# Patient Record
Sex: MALE | Race: WHITE | Employment: OTHER | URBAN - METROPOLITAN AREA
[De-identification: names, ages, dates, MRNs, and addresses within clinical notes are randomized per-mention and may not be internally consistent; named-entity substitution may affect disease eponyms.]

---

## 2017-03-27 RX ORDER — CARVEDILOL 25 MG/1
TABLET ORAL
Qty: 180 TABLET | Refills: 2 | Status: SHIPPED | OUTPATIENT
Start: 2017-03-27 | End: 2017-12-08 | Stop reason: SDUPTHER

## 2017-03-27 RX ORDER — HYDRALAZINE HYDROCHLORIDE 50 MG/1
TABLET, FILM COATED ORAL
Qty: 270 TABLET | Refills: 2 | Status: SHIPPED | OUTPATIENT
Start: 2017-03-27 | End: 2017-12-08 | Stop reason: SDUPTHER

## 2017-04-28 ENCOUNTER — OFFICE VISIT (OUTPATIENT)
Dept: CARDIOLOGY CLINIC | Age: 82
End: 2017-04-28

## 2017-04-28 VITALS
HEART RATE: 57 BPM | HEIGHT: 67 IN | WEIGHT: 171 LBS | DIASTOLIC BLOOD PRESSURE: 70 MMHG | SYSTOLIC BLOOD PRESSURE: 160 MMHG | BODY MASS INDEX: 26.84 KG/M2

## 2017-04-28 DIAGNOSIS — I42.9 CARDIOMYOPATHY (HCC): ICD-10-CM

## 2017-04-28 DIAGNOSIS — E78.2 MIXED HYPERLIPIDEMIA: ICD-10-CM

## 2017-04-28 DIAGNOSIS — I10 ESSENTIAL HYPERTENSION: Primary | ICD-10-CM

## 2017-04-28 PROCEDURE — G8420 CALC BMI NORM PARAMETERS: HCPCS | Performed by: INTERNAL MEDICINE

## 2017-04-28 PROCEDURE — 93000 ELECTROCARDIOGRAM COMPLETE: CPT | Performed by: INTERNAL MEDICINE

## 2017-04-28 PROCEDURE — 1123F ACP DISCUSS/DSCN MKR DOCD: CPT | Performed by: INTERNAL MEDICINE

## 2017-04-28 PROCEDURE — 99214 OFFICE O/P EST MOD 30 MIN: CPT | Performed by: INTERNAL MEDICINE

## 2017-04-28 PROCEDURE — 1036F TOBACCO NON-USER: CPT | Performed by: INTERNAL MEDICINE

## 2017-04-28 PROCEDURE — 4040F PNEUMOC VAC/ADMIN/RCVD: CPT | Performed by: INTERNAL MEDICINE

## 2017-04-28 PROCEDURE — G8428 CUR MEDS NOT DOCUMENT: HCPCS | Performed by: INTERNAL MEDICINE

## 2017-07-07 RX ORDER — LOSARTAN POTASSIUM 100 MG/1
TABLET ORAL
Qty: 90 TABLET | Refills: 3 | Status: SHIPPED | OUTPATIENT
Start: 2017-07-07 | End: 2019-07-22 | Stop reason: SDUPTHER

## 2017-11-10 ENCOUNTER — OFFICE VISIT (OUTPATIENT)
Dept: CARDIOLOGY CLINIC | Age: 82
End: 2017-11-10

## 2017-11-10 VITALS
HEIGHT: 67 IN | SYSTOLIC BLOOD PRESSURE: 122 MMHG | DIASTOLIC BLOOD PRESSURE: 64 MMHG | HEART RATE: 60 BPM | BODY MASS INDEX: 27.15 KG/M2 | WEIGHT: 173 LBS

## 2017-11-10 DIAGNOSIS — I65.23 BILATERAL CAROTID ARTERY STENOSIS: ICD-10-CM

## 2017-11-10 DIAGNOSIS — I42.9 CARDIOMYOPATHY, UNSPECIFIED TYPE (HCC): ICD-10-CM

## 2017-11-10 DIAGNOSIS — I71.40 ABDOMINAL AORTIC ANEURYSM (AAA) WITHOUT RUPTURE: Primary | ICD-10-CM

## 2017-11-10 DIAGNOSIS — E78.2 MIXED HYPERLIPIDEMIA: ICD-10-CM

## 2017-11-10 DIAGNOSIS — I10 ESSENTIAL HYPERTENSION: ICD-10-CM

## 2017-11-10 PROCEDURE — G8598 ASA/ANTIPLAT THER USED: HCPCS | Performed by: INTERNAL MEDICINE

## 2017-11-10 PROCEDURE — G8427 DOCREV CUR MEDS BY ELIG CLIN: HCPCS | Performed by: INTERNAL MEDICINE

## 2017-11-10 PROCEDURE — 4040F PNEUMOC VAC/ADMIN/RCVD: CPT | Performed by: INTERNAL MEDICINE

## 2017-11-10 PROCEDURE — 99214 OFFICE O/P EST MOD 30 MIN: CPT | Performed by: INTERNAL MEDICINE

## 2017-11-10 PROCEDURE — G8484 FLU IMMUNIZE NO ADMIN: HCPCS | Performed by: INTERNAL MEDICINE

## 2017-11-10 PROCEDURE — 1123F ACP DISCUSS/DSCN MKR DOCD: CPT | Performed by: INTERNAL MEDICINE

## 2017-11-10 PROCEDURE — 1036F TOBACCO NON-USER: CPT | Performed by: INTERNAL MEDICINE

## 2017-11-10 PROCEDURE — G8419 CALC BMI OUT NRM PARAM NOF/U: HCPCS | Performed by: INTERNAL MEDICINE

## 2017-11-10 NOTE — PROGRESS NOTES
[address]   Cardiac Evaluation      Patient: Logan Sosa  YOB: 1931  Date: 11/10/17     Chief Complaint   Patient presents with    Cardiomyopathy     h/o AAA    Hypertension    Hyperlipidemia    Carotid Disease      Referring provider: Curtis Baldwin    History of Present Illness:   Mr. Casa Gupta comes to the office today in regular follow up. His history includes nonischemic cardiomyopathy, hypertension, and hyperlipidemia. Echo in 2007 showed EF 35%, small posterior pericardial effusion, mild to moderate AR and MR. AAA is 3.6cm per Dr. Mauricio Cruz May, 2013. Mr. Casa Gupta states he feels pretty well overall. He denies exertional chest pain, BALL/PND, palpitations, light-headedness, edema. He is very attentive to his wife and takes care of his yard. He takes long walks around the block several times a week. His recorded home BPs are generally 120-140. Past Medical History:   has a past medical history of Cardiomyopathy ([address] Gastric ulcer; Hyperlipidemia; and Hypertension. Surgical History:   has a past surgical history that includes Diagnostic Cardiac Cath Lab Procedure; hernia repair; and External ear surgery (Left, 08/31/2016). Social History:   reports that he quit smoking about 60 years ago. He does not have any smokeless tobacco history on file. He reports that he does not drink alcohol or use drugs. Family History:  family history is not on file.      Current Outpatient Prescriptions on File Prior to Visit   Medication Sig Dispense Refill    losartan (COZAAR) 100 MG tablet Take 1 tablet by mouth  daily 90 tablet 3    hydrALAZINE (APRESOLINE) 50 MG tablet Take 1 and 1/2 tablets by  mouth 2 times daily 270 tablet 2    carvedilol (COREG) 25 MG tablet Take 1 tablet by mouth two  times daily 180 tablet 2    Multiple Vitamins-Minerals (ICAPS AREDS 2) CAPS Take by mouth      acetaminophen (TYLENOL) 325 MG tablet Take 650 mg by mouth every 6 hours as needed for

## 2017-11-10 NOTE — LETTER
Medication Sig Dispense Refill    losartan (COZAAR) 100 MG tablet Take 1 tablet by mouth  daily 90 tablet 3    hydrALAZINE (APRESOLINE) 50 MG tablet Take 1 and 1/2 tablets by  mouth 2 times daily 270 tablet 2    carvedilol (COREG) 25 MG tablet Take 1 tablet by mouth two  times daily 180 tablet 2    Multiple Vitamins-Minerals (ICAPS AREDS 2) CAPS Take by mouth      acetaminophen (TYLENOL) 325 MG tablet Take 650 mg by mouth every 6 hours as needed for Pain      terazosin (HYTRIN) 5 MG capsule Take 1 capsule by mouth nightly 30 capsule 6    famotidine (PEPCID) 20 MG tablet Take 1 tablet by mouth daily 60 tablet 6    hydrochlorothiazide (HYDRODIURIL) 25 MG tablet Take 1 tablet by mouth daily. 30 tablet 6    cloNIDine (CATAPRES) 0.2 MG tablet Take 1 tablet by mouth nightly. 60 tablet 6    allopurinol (ZYLOPRIM) 300 MG tablet Take 300 mg by mouth daily.  simvastatin (ZOCOR) 20 MG tablet Take 20 mg by mouth nightly.  aspirin 81 MG chewable tablet Take 81 mg by mouth daily. No current facility-administered medications on file prior to visit. Review of Systems:   · Constitutional: there has been no unanticipated weight loss. No change in energy or activity level   · Eyes: No visual changes   · ENT: No Headaches, hearing loss or vertigo. No mouth sores or sore throat. · Cardiovascular: Reviewed in HPI  · Respiratory: No cough or wheezing, no sputum production. No hematemesis. · Gastrointestinal: No abdominal pain, appetite loss, blood in stools. No change in bowel or bladder habits. · Genitourinary: No nocturia, dysuria, trouble voiding  · Musculoskeletal:  No gait disturbance  · Integumentary: No rash or pruritis. · Neurological: No headache. No change in gait, balance, coordination, mood, affect, memory, mentation, behavior.   · Psychiatric: No anxiety or depression  · Endocrine: No malaise or fever  · Hematologic/Lymphatic: No abnormal bruising or bleeding, blood clots or swollen lymph nodes. · Allergic/Immunologic: No nasal congestion or hives. Physical Examination:    Vitals:    11/10/17 1452   BP: 122/64   Site: Left Arm   Position: Sitting   Cuff Size: Medium Adult   Pulse: 60   Weight: 173 lb (78.5 kg)   Height: 5' 7\" (1.702 m)     Body mass index is 27.1 kg/m². Wt Readings from Last 3 Encounters:   11/10/17 173 lb (78.5 kg)   04/28/17 171 lb (77.6 kg)   11/18/16 171 lb (77.6 kg)     BP Readings from Last 3 Encounters:   11/10/17 122/64   04/28/17 (!) 160/70   11/18/16 (!) 116/56       Constitutional and General Appearance:  appears stated age  Respiratory:  · Normal excursion and expansion without use of accessory muscles  · Resp Auscultation: Normal breath sounds without dullness  Cardiovascular:  · The apical impulses not displaced  · Heart is regular rate and rhythm with normal S1, positive S4, 2/6 PABLO, reversed split S2  · The carotid upstroke is normal, no bruit noted   · JVP is not elevated  · Peripheral pulses are symmetrical  · There is no clubbing, cyanosis of the extremities  · No edema  · Femoral Arteries: 2+ and equal  · Pedal Pulses: 2+ and equal   Abdomen:  · No masses or tenderness  · Normal bowel sounds  Neurological/Psychiatric:  · Alert and oriented x3  · Moves all extremities well  · Exhibits normal gait balance and coordination    Assessment:  1. Abdominal aneurysm without mention of rupture: follows with Dr. Francisco Carmichael. CT 5/13> 3.6cm, screening 2/21/17: 3.4cm   2. Cardiomyopathy: Stable, no evidence of heart failure by exam today  Echo 2/14> EF 35%. moderate global hypokinesis. reversal of E/A inflow velocities across the mitral valve suggesting impaired left ventricular relaxation. aortic valve is mildly calcified, opens well with normal gradients. Mild-to-moderate aortic regurgitation is present. Pressure half-time is calculated at 770 msec. mild mitral and tricuspid regurgitation with RVSP 30 mmHg.  Mild-to-moderate pulmonic regurgitation

## 2017-11-10 NOTE — COMMUNICATION BODY
Pain      terazosin (HYTRIN) 5 MG capsule Take 1 capsule by mouth nightly 30 capsule 6    famotidine (PEPCID) 20 MG tablet Take 1 tablet by mouth daily 60 tablet 6    hydrochlorothiazide (HYDRODIURIL) 25 MG tablet Take 1 tablet by mouth daily. 30 tablet 6    cloNIDine (CATAPRES) 0.2 MG tablet Take 1 tablet by mouth nightly. 60 tablet 6    allopurinol (ZYLOPRIM) 300 MG tablet Take 300 mg by mouth daily.  simvastatin (ZOCOR) 20 MG tablet Take 20 mg by mouth nightly.  aspirin 81 MG chewable tablet Take 81 mg by mouth daily. No current facility-administered medications on file prior to visit. Review of Systems:   · Constitutional: there has been no unanticipated weight loss. No change in energy or activity level   · Eyes: No visual changes   · ENT: No Headaches, hearing loss or vertigo. No mouth sores or sore throat. · Cardiovascular: Reviewed in HPI  · Respiratory: No cough or wheezing, no sputum production. No hematemesis. · Gastrointestinal: No abdominal pain, appetite loss, blood in stools. No change in bowel or bladder habits. · Genitourinary: No nocturia, dysuria, trouble voiding  · Musculoskeletal:  No gait disturbance  · Integumentary: No rash or pruritis. · Neurological: No headache. No change in gait, balance, coordination, mood, affect, memory, mentation, behavior. · Psychiatric: No anxiety or depression  · Endocrine: No malaise or fever  · Hematologic/Lymphatic: No abnormal bruising or bleeding, blood clots or swollen lymph nodes. · Allergic/Immunologic: No nasal congestion or hives. Physical Examination:    Vitals:    11/10/17 1452   BP: 122/64   Site: Left Arm   Position: Sitting   Cuff Size: Medium Adult   Pulse: 60   Weight: 173 lb (78.5 kg)   Height: 5' 7\" (1.702 m)     Body mass index is 27.1 kg/m².      Wt Readings from Last 3 Encounters:   11/10/17 173 lb (78.5 kg)   04/28/17 171 lb (77.6 kg)   11/18/16 171 lb (77.6 kg)     BP Readings from Last 3 Encounters: 11/10/17 122/64   04/28/17 (!) 160/70   11/18/16 (!) 116/56       Constitutional and General Appearance:  appears stated age  Respiratory:  · Normal excursion and expansion without use of accessory muscles  · Resp Auscultation: Normal breath sounds without dullness  Cardiovascular:  · The apical impulses not displaced  · Heart is regular rate and rhythm with normal S1, positive S4, 2/6 PABLO, reversed split S2  · The carotid upstroke is normal, no bruit noted   · JVP is not elevated  · Peripheral pulses are symmetrical  · There is no clubbing, cyanosis of the extremities  · No edema  · Femoral Arteries: 2+ and equal  · Pedal Pulses: 2+ and equal   Abdomen:  · No masses or tenderness  · Normal bowel sounds  Neurological/Psychiatric:  · Alert and oriented x3  · Moves all extremities well  · Exhibits normal gait balance and coordination    Assessment:  1. Abdominal aneurysm without mention of rupture: follows with Dr. Latrice Yan. CT 5/13> 3.6cm, screening 2/21/17: 3.4cm   2. Cardiomyopathy: Stable, no evidence of heart failure by exam today  Echo 2/14> EF 35%. moderate global hypokinesis. reversal of E/A inflow velocities across the mitral valve suggesting impaired left ventricular relaxation. aortic valve is mildly calcified, opens well with normal gradients. Mild-to-moderate aortic regurgitation is present. Pressure half-time is calculated at 770 msec. mild mitral and tricuspid regurgitation with RVSP 30 mmHg. Mild-to-moderate pulmonic regurgitation present. Upper limits of normal to mildly dilated left atrium with a volume of 66ml  Echo 9/08>  EF 35%, LVH, mild to moderate AI   3. Hypertension: Stable. Blood pressure 122/64, pulse 60, height 5' 7\" (1.702 m), weight 173 lb (78.5 kg). 4. Hyperlipemia: Well controlled on Zocor 20mg. 5.  Carotid stenosis: Stable. Doppler 6/3/16> 16-49% bilateral    EKG 5/3/17> Sinus bradycardia, LBBB    Plan:  Mr. Kenneth Santiago has a stable cardiac status. 1. No med changes.   2. Will

## 2017-12-11 RX ORDER — HYDRALAZINE HYDROCHLORIDE 50 MG/1
TABLET, FILM COATED ORAL
Qty: 270 TABLET | Refills: 3 | Status: SHIPPED | OUTPATIENT
Start: 2017-12-11 | End: 2018-09-14 | Stop reason: SDUPTHER

## 2017-12-11 RX ORDER — CARVEDILOL 25 MG/1
TABLET ORAL
Qty: 180 TABLET | Refills: 3 | Status: SHIPPED | OUTPATIENT
Start: 2017-12-11 | End: 2018-11-28 | Stop reason: SDUPTHER

## 2018-05-18 ENCOUNTER — OFFICE VISIT (OUTPATIENT)
Dept: CARDIOLOGY CLINIC | Age: 83
End: 2018-05-18

## 2018-05-18 VITALS
WEIGHT: 166 LBS | DIASTOLIC BLOOD PRESSURE: 54 MMHG | HEIGHT: 67 IN | SYSTOLIC BLOOD PRESSURE: 110 MMHG | BODY MASS INDEX: 26.06 KG/M2 | HEART RATE: 70 BPM

## 2018-05-18 DIAGNOSIS — I42.9 CARDIOMYOPATHY, UNSPECIFIED TYPE (HCC): Primary | ICD-10-CM

## 2018-05-18 DIAGNOSIS — I10 ESSENTIAL HYPERTENSION: ICD-10-CM

## 2018-05-18 DIAGNOSIS — E78.2 MIXED HYPERLIPIDEMIA: ICD-10-CM

## 2018-05-18 PROCEDURE — 4040F PNEUMOC VAC/ADMIN/RCVD: CPT | Performed by: INTERNAL MEDICINE

## 2018-05-18 PROCEDURE — 99214 OFFICE O/P EST MOD 30 MIN: CPT | Performed by: INTERNAL MEDICINE

## 2018-05-18 PROCEDURE — G8427 DOCREV CUR MEDS BY ELIG CLIN: HCPCS | Performed by: INTERNAL MEDICINE

## 2018-05-18 PROCEDURE — 1036F TOBACCO NON-USER: CPT | Performed by: INTERNAL MEDICINE

## 2018-05-18 PROCEDURE — G8419 CALC BMI OUT NRM PARAM NOF/U: HCPCS | Performed by: INTERNAL MEDICINE

## 2018-05-18 PROCEDURE — G8598 ASA/ANTIPLAT THER USED: HCPCS | Performed by: INTERNAL MEDICINE

## 2018-05-18 PROCEDURE — 1123F ACP DISCUSS/DSCN MKR DOCD: CPT | Performed by: INTERNAL MEDICINE

## 2018-05-18 RX ORDER — BRIMONIDINE TARTRATE, TIMOLOL MALEATE 2; 5 MG/ML; MG/ML
1 SOLUTION/ DROPS OPHTHALMIC EVERY 12 HOURS
COMMUNITY

## 2018-06-25 DIAGNOSIS — K40.91 RECURRENT INGUINAL HERNIA WITHOUT OBSTRUCTION OR GANGRENE, UNSPECIFIED LATERALITY: Primary | ICD-10-CM

## 2018-06-29 ENCOUNTER — TELEPHONE (OUTPATIENT)
Dept: CARDIOLOGY CLINIC | Age: 83
End: 2018-06-29

## 2018-07-10 ENCOUNTER — OFFICE VISIT (OUTPATIENT)
Dept: SURGERY | Age: 83
End: 2018-07-10

## 2018-07-10 VITALS
DIASTOLIC BLOOD PRESSURE: 62 MMHG | BODY MASS INDEX: 25.46 KG/M2 | HEIGHT: 68 IN | SYSTOLIC BLOOD PRESSURE: 136 MMHG | WEIGHT: 168 LBS

## 2018-07-10 DIAGNOSIS — K40.90 LEFT INGUINAL HERNIA: Primary | ICD-10-CM

## 2018-07-10 PROCEDURE — G8419 CALC BMI OUT NRM PARAM NOF/U: HCPCS | Performed by: SURGERY

## 2018-07-10 PROCEDURE — 99203 OFFICE O/P NEW LOW 30 MIN: CPT | Performed by: SURGERY

## 2018-07-10 PROCEDURE — G8427 DOCREV CUR MEDS BY ELIG CLIN: HCPCS | Performed by: SURGERY

## 2018-07-10 PROCEDURE — 1101F PT FALLS ASSESS-DOCD LE1/YR: CPT | Performed by: SURGERY

## 2018-07-10 ASSESSMENT — ENCOUNTER SYMPTOMS
EYE ITCHING: 1
GASTROINTESTINAL NEGATIVE: 1
SHORTNESS OF BREATH: 1
BACK PAIN: 1
ALLERGIC/IMMUNOLOGIC NEGATIVE: 1
COLOR CHANGE: 1

## 2018-07-10 NOTE — PROGRESS NOTES
Yes Mariah Herrera MD   famotidine (PEPCID) 20 MG tablet Take 1 tablet by mouth daily 5/4/15  Yes Mariah Herrera MD   hydrochlorothiazide (HYDRODIURIL) 25 MG tablet Take 1 tablet by mouth daily. 11/10/14  Yes Mariah Herrera MD   cloNIDine (CATAPRES) 0.2 MG tablet Take 1 tablet by mouth nightly. Patient taking differently: Take 0.1 mg by mouth nightly  11/10/14  Yes Mariah Herrera MD   allopurinol (ZYLOPRIM) 300 MG tablet Take 300 mg by mouth daily. Yes Historical Provider, MD   simvastatin (ZOCOR) 20 MG tablet Take 20 mg by mouth nightly. Yes Historical Provider, MD   aspirin 81 MG chewable tablet Take 81 mg by mouth daily. Yes Historical Provider, MD        Allergies:  Penicillins    Social History:    reports that he quit smoking about 61 years ago. He has never used smokeless tobacco. He reports that he does not drink alcohol or use drugs. Family History:    No family history on file. REVIEW OF SYSTEMS:  Review of Systems   Constitutional: Positive for activity change, appetite change, chills and fatigue. HENT: Positive for drooling, hearing loss and sneezing. Eyes: Positive for itching. Respiratory: Positive for shortness of breath. Cardiovascular: Positive for leg swelling. Gastrointestinal: Negative. Endocrine: Negative. Genitourinary: Positive for decreased urine volume and frequency. Musculoskeletal: Positive for back pain and neck stiffness. Skin: Positive for color change. Allergic/Immunologic: Negative. Neurological: Positive for dizziness, weakness and light-headedness. Hematological: Bruises/bleeds easily. Psychiatric/Behavioral: The patient is nervous/anxious.         PHYSICAL EXAM:  VITALS:  /62   Ht 5' 8\" (1.727 m)   Wt 168 lb (76.2 kg)   BMI 25.54 kg/m²   CONSTITUTIONAL:  alert, no apparent distress and normal weight  EYES:  sclera clear  ENT:  normocepalic, without obvious abnormality  NECK:  supple, symmetrical, trachea midline  LUNGS:  clear to auscultation  CARDIOVASCULAR:  regular rate and rhythm  ABDOMEN:  scars noted are healed, normal bowel sounds, soft, non-distended, tenderness noted in the left lower quadrant, voluntary guarding absent, no masses palpated, no hepatosplenomegally and hernia present - left inguinal  MUSCULOSKELETAL:  0+ edema lower extremities  NEUROLOGIC:  Mental Status Exam:  Level of Alertness:   awake  Orientation:   person, place, time  SKIN:  no bruising or bleeding, normal skin color, texture, turgor, no redness, warmth, or swelling and no jaundice    DATA:      Radiology Review:   None    IMPRESSION/RECOMMENDATIONS:    Left inguinal hernia. Lap Left inguinal hernia repair will be scheduled. The plan for surgery has been reviewed in detail today. Risks and benefits have been reviewed, and all questions answered. Will schedule at [facility] after pt assesses wife's upcoming health issue needs.        Radha Mckeon

## 2018-07-19 ENCOUNTER — TELEPHONE (OUTPATIENT)
Dept: CARDIOLOGY CLINIC | Age: 83
End: 2018-07-19

## 2018-07-19 NOTE — TELEPHONE ENCOUNTER
Per Dr Remy Montes patient has stable cardiomyopathy and valvular disease; may proceed with hernia repair.

## 2018-07-19 NOTE — LETTER
[address]  [address] . [facility]  [address] [address]  Phone: [phone]  Fax: [phone]    Kenney Tucker MD        July 19, 2018     Patient: Virgilio Mendoza   YOB: 1931   Date of Visit: 7/19/2018       To Whom It May Concern: It is my medical opinion that Bonnie Chandra has stable cardiomyopathy and valvular disease; may proceed with hernia repair. If you have any questions or concerns, please don't hesitate to call.     Sincerely,        Kenney Tucker MD

## 2018-07-23 ENCOUNTER — HOSPITAL ENCOUNTER (OUTPATIENT)
Dept: SURGERY | Age: 83
Discharge: HOME | End: 2018-07-23
Attending: SURGERY | Admitting: SURGERY

## 2018-07-23 VITALS
HEART RATE: 76 BPM | TEMPERATURE: 97.3 F | OXYGEN SATURATION: 95 % | HEIGHT: 68 IN | BODY MASS INDEX: 25.17 KG/M2 | DIASTOLIC BLOOD PRESSURE: 69 MMHG | RESPIRATION RATE: 13 BRPM | WEIGHT: 166.06 LBS | SYSTOLIC BLOOD PRESSURE: 183 MMHG

## 2018-07-23 DIAGNOSIS — L76.82 PAIN AT SURGICAL INCISION: Primary | ICD-10-CM

## 2018-07-23 DIAGNOSIS — K40.90 LEFT INGUINAL HERNIA: ICD-10-CM

## 2018-07-23 PROCEDURE — 49650 LAP ING HERNIA REPAIR INIT: CPT | Performed by: SURGERY

## 2018-07-23 RX ORDER — ONDANSETRON 2 MG/ML
4 INJECTION INTRAMUSCULAR; INTRAVENOUS
Status: ACTIVE | OUTPATIENT
Start: 2018-07-23 | End: 2018-07-23

## 2018-07-23 RX ORDER — SODIUM CHLORIDE 0.9 % (FLUSH) 0.9 %
10 SYRINGE (ML) INJECTION EVERY 12 HOURS SCHEDULED
Status: DISCONTINUED | OUTPATIENT
Start: 2018-07-23 | End: 2018-07-24 | Stop reason: HOSPADM

## 2018-07-23 RX ORDER — HYDRALAZINE HYDROCHLORIDE 20 MG/ML
5 INJECTION INTRAMUSCULAR; INTRAVENOUS EVERY 10 MIN PRN
Status: DISCONTINUED | OUTPATIENT
Start: 2018-07-23 | End: 2018-07-24 | Stop reason: HOSPADM

## 2018-07-23 RX ORDER — OXYCODONE HYDROCHLORIDE AND ACETAMINOPHEN 5; 325 MG/1; MG/1
1 TABLET ORAL
Status: COMPLETED | OUTPATIENT
Start: 2018-07-23 | End: 2018-07-23

## 2018-07-23 RX ORDER — HYDROCODONE BITARTRATE AND ACETAMINOPHEN 5; 325 MG/1; MG/1
1 TABLET ORAL EVERY 4 HOURS PRN
Qty: 20 TABLET | Refills: 0 | Status: SHIPPED | OUTPATIENT
Start: 2018-07-23 | End: 2018-07-30

## 2018-07-23 RX ORDER — LIDOCAINE HYDROCHLORIDE 10 MG/ML
1 INJECTION, SOLUTION EPIDURAL; INFILTRATION; INTRACAUDAL; PERINEURAL
Status: ACTIVE | OUTPATIENT
Start: 2018-07-23 | End: 2018-07-23

## 2018-07-23 RX ORDER — HYDROMORPHONE HCL 110MG/55ML
0.25 PATIENT CONTROLLED ANALGESIA SYRINGE INTRAVENOUS EVERY 5 MIN PRN
Status: DISCONTINUED | OUTPATIENT
Start: 2018-07-23 | End: 2018-07-24 | Stop reason: HOSPADM

## 2018-07-23 RX ORDER — SODIUM CHLORIDE, SODIUM LACTATE, POTASSIUM CHLORIDE, CALCIUM CHLORIDE 600; 310; 30; 20 MG/100ML; MG/100ML; MG/100ML; MG/100ML
INJECTION, SOLUTION INTRAVENOUS CONTINUOUS
Status: DISCONTINUED | OUTPATIENT
Start: 2018-07-23 | End: 2018-07-24 | Stop reason: HOSPADM

## 2018-07-23 RX ORDER — CIPROFLOXACIN 2 MG/ML
400 INJECTION, SOLUTION INTRAVENOUS ONCE
Status: COMPLETED | OUTPATIENT
Start: 2018-07-23 | End: 2018-07-23

## 2018-07-23 RX ORDER — SODIUM CHLORIDE 0.9 % (FLUSH) 0.9 %
10 SYRINGE (ML) INJECTION PRN
Status: DISCONTINUED | OUTPATIENT
Start: 2018-07-23 | End: 2018-07-24 | Stop reason: HOSPADM

## 2018-07-23 RX ORDER — LABETALOL HYDROCHLORIDE 5 MG/ML
5 INJECTION, SOLUTION INTRAVENOUS EVERY 10 MIN PRN
Status: DISCONTINUED | OUTPATIENT
Start: 2018-07-23 | End: 2018-07-24 | Stop reason: HOSPADM

## 2018-07-23 RX ADMIN — Medication 0.25 MG: at 14:12

## 2018-07-23 RX ADMIN — CIPROFLOXACIN 400 MG: 2 INJECTION, SOLUTION INTRAVENOUS at 12:22

## 2018-07-23 RX ADMIN — OXYCODONE HYDROCHLORIDE AND ACETAMINOPHEN 1 TABLET: 5; 325 TABLET ORAL at 14:21

## 2018-07-23 RX ADMIN — SODIUM CHLORIDE, SODIUM LACTATE, POTASSIUM CHLORIDE, CALCIUM CHLORIDE: 600; 310; 30; 20 INJECTION, SOLUTION INTRAVENOUS at 11:57

## 2018-07-23 ASSESSMENT — PAIN DESCRIPTION - LOCATION: LOCATION: GROIN

## 2018-07-23 ASSESSMENT — PAIN - FUNCTIONAL ASSESSMENT: PAIN_FUNCTIONAL_ASSESSMENT: 0-10

## 2018-07-23 ASSESSMENT — PAIN DESCRIPTION - PAIN TYPE: TYPE: SURGICAL PAIN

## 2018-07-23 ASSESSMENT — PAIN DESCRIPTION - DESCRIPTORS
DESCRIPTORS: BURNING
DESCRIPTORS: ACHING

## 2018-07-23 ASSESSMENT — PAIN SCALES - GENERAL
PAINLEVEL_OUTOF10: 6
PAINLEVEL_OUTOF10: 6

## 2018-07-23 ASSESSMENT — PAIN DESCRIPTION - FREQUENCY: FREQUENCY: CONTINUOUS

## 2018-07-23 ASSESSMENT — PAIN DESCRIPTION - ORIENTATION: ORIENTATION: LEFT

## 2018-07-23 ASSESSMENT — PAIN DESCRIPTION - ONSET: ONSET: ON-GOING

## 2018-07-23 NOTE — BRIEF OP NOTE
Brief Postoperative Note    Almas Mast  YOB: 1931  [id number]    Pre-operative Diagnosis: Left inguinal Hernia    Post-operative Diagnosis: Same    Procedure: Lap LIH repair with mesh    Anesthesia: General and Local    Surgeons/Assistants: Yissel    Estimated Blood Loss: less than 50     Complications: None    Specimens: Was Not Obtained    Findings: Large LIH present, large direct component reduced.  Repair done with Bard 3 D max left extra large mesh    Electronically signed by Crista Mc MD on 7/23/2018 at 1:27 PM

## 2018-07-23 NOTE — OP NOTE
[facility]                      [address] [facility], [address] [place] [address]                                 OPERATIVE REPORT    PATIENT NAME: Hilario Villalta                    :        1931  MED REC NO:   [id number]                          ROOM:  ACCOUNT NO:   [de-identified]                          ADMIT DATE: 2018  PROVIDER:     Frederic Branham MD    DATE OF PROCEDURE:  2018    PREOPERATIVE DIAGNOSIS:  Left inguinal hernia. POSTOPERATIVE DIAGNOSIS:  Left inguinal hernia. PROCEDURE:  Laparoscopic preperitoneal left inguinal hernia repair with  mesh, Bard 3DMax left-sided extra large mesh used for repair. SURGEON:  Frederic Branham M.D. ANESTHESIA:  General plus local at port sites as well. ESTIMATED BLOOD LOSS:  Minimal.    COMPLICATIONS:  None. SPECIMENS:  None obtained. FINDINGS:  A large left inguinal hernia present. There was a large  incarcerated direct component reduced and repair was performed with the  Bard mesh. INDICATIONS:  The patient is an 80-year-old gentleman presenting with a  tender, enlarging left inguinal hernia for repair today. The site is  confirmed with him preoperatively. All questions were answered with the  patient and a family and he consents to proceed. Antibiotics were ordered. ADDITIONAL DETAILS OF SURGERY:  The patient was brought to the operating  room, placed on the operating table in supine position. Compression boots  were placed. Antibiotics were completed. General anesthetic was  administered. The abdomen was prepped and draped sterilely. Time-out was done and local anesthetic was administered infraumbilically. Incision was made at this site and dissection was carried down through the  skin and subcutaneous tissue to the rectus fascia. To the left midline,  the rectus sheath was opened. The muscles were split to the side and the  posterior rectus sheath was visualized.   In this plane, the balloon  dissecting catheter was passed down to the level of the pubis and inflated  with 30 pumps of bulb. Balloon was then removed and the working 12 mm port  was placed at this site. The preperitoneal space was then insufflated to  15 mmHg pressure. Two 5 mm ports were then placed in the lower midline  under direct vision. The anatomy was then dissected. There was a large  incarcerated direct hernia present, which was reduced back deep in the  preperitoneal space. The lateral space was opened widely. A lateral  femoral cutaneous nerve was identified and carefully avoided. The cord was  dissected. There was a small lipoma of the cord, which was reduced back  along with a small indirect sac as well. Once this was completed, the  repair was performed. The Bard 3DMax left-sided extra large mesh was used  for the repair. This was positioned covering the direct space, indirect  space, and femoral space nicely. It was then tacked medially at the pubis,  inferiorly at Christian's ligament, and superiorly into the posterior aspect  of the rectus muscle above Hesselbach's triangle. No tacks were placed out  laterally or inferolaterally as to avoid the nerves and vessels in these  locations. Mesh was held down in position out laterally and sat down  nicely for the repair. The preperitoneal space was deflated. The  instruments and ports were then removed. The port sites appeared  hemostatic. The instruments were all removed and the counts were correct. The fascia was closed at the umbilical site with 0 Vicryl figure-of-eight  suture. Skin was closed at all sites with 4-0 Monocryl suture. Skin Affix  glue was placed. The patient was then taken to recovery room postop.         Benito Wood MD    D: 07/23/2018 13:44:45       T: 07/23/2018 13:48:07     [id number]  Job#: [id number]     Doc#: [id number]    CC:  Vanessa Sharma

## 2018-07-23 NOTE — H&P
[facility]  and Laparoscopic Surgery      I have reviewed the history and physical and examined the patient and find no relevant changes. I have reviewed with the patient and/or family the risks, benefits, and alternatives to the procedure.     Naila Hampton MD  7/23/2018

## 2018-07-23 NOTE — ANESTHESIA PRE-OP
drop into both eyes every 12 hours      carvedilol (COREG) 25 MG tablet TAKE 1 TABLET BY MOUTH TWO  TIMES DAILY 180 tablet 3    hydrALAZINE (APRESOLINE) 50 MG tablet TAKE 1 AND 1/2 TABLETS BY  MOUTH 2 TIMES DAILY 270 tablet 3    losartan (COZAAR) 100 MG tablet Take 1 tablet by mouth  daily (Patient taking differently: Take 1/2 tablet by mouth  daily) 90 tablet 3    acetaminophen (TYLENOL) 325 MG tablet Take 650 mg by mouth nightly       terazosin (HYTRIN) 5 MG capsule Take 1 capsule by mouth nightly 30 capsule 6    famotidine (PEPCID) 20 MG tablet Take 1 tablet by mouth daily (Patient taking differently: Take 20 mg by mouth daily TAKES TWICE A DAY) 60 tablet 6    hydrochlorothiazide (HYDRODIURIL) 25 MG tablet Take 1 tablet by mouth daily. 30 tablet 6    cloNIDine (CATAPRES) 0.2 MG tablet Take 1 tablet by mouth nightly. (Patient taking differently: Take 0.1 mg by mouth nightly ) 60 tablet 6    allopurinol (ZYLOPRIM) 300 MG tablet Take 300 mg by mouth daily.  simvastatin (ZOCOR) 20 MG tablet Take 20 mg by mouth nightly.  aspirin 81 MG chewable tablet Take 81 mg by mouth daily. Current Facility-Administered Medications   Medication Dose Route Frequency Provider Last Rate Last Dose    HYDROmorphone (DILAUDID) injection 0.25 mg  0.25 mg Intravenous Q5 Min PRN González Gomez MD        oxyCODONE-acetaminophen (PERCOCET) 5-325 MG per tablet 1 tablet  1 tablet Oral Once PRN González Gomez MD        ondansetron [facility]) injection 4 mg  4 mg Intravenous Once PRN González Gomez MD        labetalol (NORMODYNE;TRANDATE) injection 5 mg  5 mg Intravenous Q10 Min PRN González Gomez MD        hydrALAZINE (APRESOLINE) injection 5 mg  5 mg Intravenous Q10 Min PRN González Gomez MD           Allergies:     Allergies   Allergen Reactions    Penicillins Swelling and Hives    Indapamide      itching    Naproxen     Omeprazole Magnesium      Nausea, chest pain, dysphagia       Problem List:    Patient Active Problem List   Diagnosis Code    Hyperlipemia E78.5    HTN (hypertension) I10    Cardiomyopathy ([facility] I42.9    Abdominal aortic aneurysm (HCC) I71.4    Bilateral carotid artery stenosis I65.23       Past Medical History:        Diagnosis Date    Arthritis     Blind right eye     Cardiomyopathy ([facility]     Gastric ulcer     Glaucoma     Gout     Hyperlipidemia     Hypertension     PVD (peripheral vascular disease) ([facility]        Past Surgical History:        Procedure Laterality Date    DIAGNOSTIC CARDIAC CATH LAB PROCEDURE      EXTERNAL EAR SURGERY Left 08/31/2016    ACTUAL PROCEDURE: LEFT EAR RECONSTRUCTION     HERNIA REPAIR      KNEE ARTHROSCOPY      VASECTOMY         Social History:    Social History   Substance Use Topics    Smoking status: Former Smoker     Quit date: 1/1/1957    Smokeless tobacco: Never Used      Comment: only smoked in the service    Alcohol use No                                Counseling given: Not Answered      Vital Signs (Current): There were no vitals filed for this visit. BP Readings from Last 3 Encounters:   07/10/18 136/62   05/18/18 (!) 110/54   11/10/17 122/64       NPO Status:                                                   Date of last liquid consumption: 07/22/18                        Date of last solid food consumption: 07/22/18    BMI:   Wt Readings from Last 3 Encounters:   07/18/18 165 lb (74.8 kg)   07/10/18 168 lb (76.2 kg)   05/18/18 166 lb (75.3 kg)     There is no height or weight on file to calculate BMI.     Anesthesia Evaluation  Patient summary reviewed and Nursing notes reviewed no history of anesthetic complications:   Airway: Mallampati: III  TM distance: >3 FB   Neck ROM: full  Mouth opening: > = 3 FB Dental: normal exam   (+) implants      Pulmonary:normal exam  breath sounds clear to auscultation      (-) COPD, asthma and sleep apnea                          ROS comment: Former smoker   Cardiovascular:    (+) hypertension:, CHF (NICM, follows cards, echo 2014 EF 35%, mod global hypok, DD, mod AR/MT, mild MR/TR, RVSP 30; currently appears well compensated, no O2 req, walks regularly, no orthopnea or LE edema): systolic, murmur, hyperlipidemia    (-) past MI, CAD, CABG/stent, dysrhythmias and  angina    ECG reviewed  Rhythm: regular  Rate: normal  Echocardiogram reviewed         Beta Blocker:  Dose within 24 Hrs         Neuro/Psych:   Negative Neuro/Psych ROS     (-) seizures, TIA and CVA           GI/Hepatic/Renal:   (+) GERD: well controlled, PUD,      (-) liver disease and no renal disease       Endo/Other:    (+) : arthritis (Gout):., .    (-) diabetes mellitus, hypothyroidism, hyperthyroidism               Abdominal:           Vascular:   + PVD, aortic or cerebral ( stable abdominal aortic aneurysm and mild bilateral carotid stenosis), . Anesthesia Plan      general     ASA 4       Induction: intravenous. MIPS: Postoperative opioids intended, Prophylactic antiemetics administered and Postoperative trial extubation. Anesthetic plan and risks discussed with patient. Plan discussed with CRNA.                   Real Parks MD   7/23/2018 detailed exam

## 2018-08-06 ENCOUNTER — OFFICE VISIT (OUTPATIENT)
Dept: SURGERY | Age: 83
End: 2018-08-06

## 2018-08-06 VITALS — DIASTOLIC BLOOD PRESSURE: 62 MMHG | SYSTOLIC BLOOD PRESSURE: 106 MMHG

## 2018-08-06 DIAGNOSIS — K40.90 LEFT INGUINAL HERNIA: Primary | ICD-10-CM

## 2018-08-06 PROCEDURE — 99024 POSTOP FOLLOW-UP VISIT: CPT | Performed by: SURGERY

## 2018-08-06 NOTE — PROGRESS NOTES
Subjective:      Patient ID: Anton Tsang is a 80 y.o. male. HPI    Review of Systems    Objective:   Physical Exam  Abdomen soft  Incisions healing well  Assessment:      70-year-old male status post laparoscopic left inguinal hernia repair with mesh per Dr. Royal Thompson. Doing well postoperatively. Plan: Activity as tolerated. Follow up as needed.

## 2018-09-14 RX ORDER — HYDRALAZINE HYDROCHLORIDE 50 MG/1
TABLET, FILM COATED ORAL
Qty: 270 TABLET | Refills: 2 | Status: SHIPPED | OUTPATIENT
Start: 2018-09-14 | End: 2019-10-01 | Stop reason: SDUPTHER

## 2018-11-28 RX ORDER — CARVEDILOL 25 MG/1
TABLET ORAL
Qty: 180 TABLET | Refills: 3 | Status: SHIPPED | OUTPATIENT
Start: 2018-11-28 | End: 2019-10-28 | Stop reason: SDUPTHER

## 2018-11-30 ENCOUNTER — OFFICE VISIT (OUTPATIENT)
Dept: CARDIOLOGY CLINIC | Age: 83
End: 2018-11-30
Payer: MEDICARE

## 2018-11-30 VITALS
BODY MASS INDEX: 26.37 KG/M2 | SYSTOLIC BLOOD PRESSURE: 118 MMHG | DIASTOLIC BLOOD PRESSURE: 54 MMHG | HEART RATE: 60 BPM | HEIGHT: 67 IN | WEIGHT: 168 LBS

## 2018-11-30 DIAGNOSIS — I10 ESSENTIAL HYPERTENSION: ICD-10-CM

## 2018-11-30 DIAGNOSIS — I42.9 CARDIOMYOPATHY, UNSPECIFIED TYPE (HCC): Primary | ICD-10-CM

## 2018-11-30 DIAGNOSIS — I71.40 ABDOMINAL AORTIC ANEURYSM (AAA) WITHOUT RUPTURE: ICD-10-CM

## 2018-11-30 DIAGNOSIS — E78.2 MIXED HYPERLIPIDEMIA: ICD-10-CM

## 2018-11-30 PROCEDURE — 1123F ACP DISCUSS/DSCN MKR DOCD: CPT | Performed by: INTERNAL MEDICINE

## 2018-11-30 PROCEDURE — 1101F PT FALLS ASSESS-DOCD LE1/YR: CPT | Performed by: INTERNAL MEDICINE

## 2018-11-30 PROCEDURE — G8598 ASA/ANTIPLAT THER USED: HCPCS | Performed by: INTERNAL MEDICINE

## 2018-11-30 PROCEDURE — G8427 DOCREV CUR MEDS BY ELIG CLIN: HCPCS | Performed by: INTERNAL MEDICINE

## 2018-11-30 PROCEDURE — 99214 OFFICE O/P EST MOD 30 MIN: CPT | Performed by: INTERNAL MEDICINE

## 2018-11-30 PROCEDURE — G8419 CALC BMI OUT NRM PARAM NOF/U: HCPCS | Performed by: INTERNAL MEDICINE

## 2018-11-30 PROCEDURE — G8484 FLU IMMUNIZE NO ADMIN: HCPCS | Performed by: INTERNAL MEDICINE

## 2018-11-30 PROCEDURE — 1036F TOBACCO NON-USER: CPT | Performed by: INTERNAL MEDICINE

## 2018-11-30 PROCEDURE — 4040F PNEUMOC VAC/ADMIN/RCVD: CPT | Performed by: INTERNAL MEDICINE

## 2018-11-30 NOTE — LETTER
· Genitourinary: No nocturia, dysuria, trouble voiding  · Musculoskeletal:  No gait disturbance  · Integumentary: No rash or pruritis. · Neurological: No headache. No change in gait, balance, coordination, mood, affect, memory, mentation, behavior. · Psychiatric: No anxiety or depression  · Endocrine: No malaise or fever  · Hematologic/Lymphatic: No abnormal bruising or bleeding, blood clots or swollen lymph nodes. · Allergic/Immunologic: No nasal congestion or hives. Physical Examination:    Vitals:    11/30/18 1416   BP: (!) 118/54   Site: Left Upper Arm   Position: Sitting   Cuff Size: Medium Adult   Pulse: 60   Weight: 168 lb (76.2 kg)   Height: 5' 7\" (1.702 m)     Body mass index is 26.31 kg/m². Wt Readings from Last 3 Encounters:   11/30/18 168 lb (76.2 kg)   07/23/18 166 lb 1 oz (75.3 kg)   07/18/18 165 lb (74.8 kg)     BP Readings from Last 3 Encounters:   11/30/18 (!) 118/54   08/06/18 106/62   07/23/18 (!) 183/69       Constitutional and General Appearance:  appears stated age  Respiratory:  · Normal excursion and expansion without use of accessory muscles  · Resp Auscultation: Normal breath sounds without dullness  Cardiovascular:  · The apical impulses not displaced  · Heart is regular rate and rhythm with normal S1, positive S4, 2/6 PABLO, reversed split S2  · The carotid upstroke is normal, no bruit noted   · JVP is not elevated  · Peripheral pulses are symmetrical  · There is no clubbing, cyanosis of the extremities  · No edema  · Femoral Arteries: 2+ and equal  · Pedal Pulses: 2+ and equal   Abdomen:  · No masses or tenderness  · Normal bowel sounds  Neurological/Psychiatric:  · Alert and oriented x3  · Moves all extremities well  · Exhibits normal gait balance and coordination    Assessment:  1. 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  Cardiomyopathy: Stable, no evidence of heart failure by exam today

## 2018-11-30 NOTE — PROGRESS NOTES
[address]   Cardiac Evaluation      Patient: Lela Sherman  YOB: 1931  Date: 11/30/18     Chief Complaint   Patient presents with    Hyperlipidemia    Hypertension      Referring provider: David Wadsworth    History of Present Illness:   Mr. Pia De Jesus comes to the office today in regular follow up. His history includes nonischemic cardiomyopathy, hypertension, and hyperlipidemia. Echo in 2007 showed EF 35%, small posterior pericardial effusion, mild to moderate AR and MRVinayak AAA is 3.6cm per Dr. Adele Olea May, 2013. Mr. Pia De Jesus states he feels pretty well overall. He denies exertional chest pain, BALL/PND, palpitations, light-headedness, edema. He is very attentive to his wife and takes care of his yard. He takes walks around the block several times a week. Past Medical History:   has a past medical history of Arthritis; Blind right eye; Cardiomyopathy ([address] Gastric ulcer; Glaucoma; Gout; Hyperlipidemia; Hypertension; and PVD (peripheral vascular disease) ([facility] [address] Surgical History:   has a past surgical history that includes Diagnostic Cardiac Cath Lab Procedure; hernia repair; External ear surgery (Left, 08/31/2016); Knee arthroscopy; Vasectomy; and Inguinal hernia repair (Right, 07/23/2018). Social History:   reports that he quit smoking about 61 years ago. He has never used smokeless tobacco. He reports that he does not drink alcohol or use drugs. Family History:  family history is not on file.      Current Outpatient Prescriptions on File Prior to Visit   Medication Sig Dispense Refill    carvedilol (COREG) 25 MG tablet TAKE 1 TABLET BY MOUTH TWO  TIMES DAILY 180 tablet 3    hydrALAZINE (APRESOLINE) 50 MG tablet TAKE 1 AND 1/2 TABLETS BY  MOUTH 2 TIMES DAILY 270 tablet 2    brimonidine-timolol (COMBIGAN) 0.2-0.5 % ophthalmic solution Place 1 drop into both eyes every 12 hours      losartan (COZAAR) 100 MG tablet Take 1 tablet by mouth  daily (Patient taking differently: Take 1/2 tablet by mouth  daily) 90 tablet 3    acetaminophen (TYLENOL) 325 MG tablet Take 650 mg by mouth nightly       famotidine (PEPCID) 20 MG tablet Take 1 tablet by mouth daily (Patient taking differently: Take 20 mg by mouth daily TAKES TWICE A DAY) 60 tablet 6    hydrochlorothiazide (HYDRODIURIL) 25 MG tablet Take 1 tablet by mouth daily. 30 tablet 6    cloNIDine (CATAPRES) 0.2 MG tablet Take 1 tablet by mouth nightly. (Patient taking differently: Take 0.1 mg by mouth nightly ) 60 tablet 6    allopurinol (ZYLOPRIM) 300 MG tablet Take 300 mg by mouth daily.  simvastatin (ZOCOR) 20 MG tablet Take 20 mg by mouth nightly.  aspirin 81 MG chewable tablet Take 81 mg by mouth daily.  terazosin (HYTRIN) 5 MG capsule Take 1 capsule by mouth nightly 30 capsule 6     No current facility-administered medications on file prior to visit. Review of Systems:   · Constitutional: there has been no unanticipated weight loss. No change in energy or activity level   · Eyes: No visual changes   · ENT: No Headaches, hearing loss or vertigo. No mouth sores or sore throat. · Cardiovascular: Reviewed in HPI  · Respiratory: No cough or wheezing, no sputum production. No hematemesis. · Gastrointestinal: No abdominal pain, appetite loss, blood in stools. No change in bowel or bladder habits. · Genitourinary: No nocturia, dysuria, trouble voiding  · Musculoskeletal:  No gait disturbance  · Integumentary: No rash or pruritis. · Neurological: No headache. No change in gait, balance, coordination, mood, affect, memory, mentation, behavior. · Psychiatric: No anxiety or depression  · Endocrine: No malaise or fever  · Hematologic/Lymphatic: No abnormal bruising or bleeding, blood clots or swollen lymph nodes. · Allergic/Immunologic: No nasal congestion or hives.     Physical Examination:    Vitals:    11/30/18 1416   BP: (!) 118/54   Site: Left Upper Arm   Position: Sitting   Cuff Size: Medium Zocor 20mg. Labs 5/29/18: ; TRIG 96; HDL 43; LDL 54   5. Carotid stenosis: Stable. Doppler 6/3/16> 16-49% bilateral      Plan: Will repeat abd US for AAA. Natalie Florez appears stable. No med changes. FU 6 months. Scribe's attestation: This note was scribed in the presence of Dr Namrata Diana MD by Jasper Myers RN. The scribe's documentation has been prepared under my direction and personally reviewed by me in its entirety. I confirm that the note above accurately reflects all work, treatment, procedures, and medical decision making performed by me. Thank you for allowing to me to participate in the care of [address] [place] [address]

## 2018-12-07 ENCOUNTER — HOSPITAL ENCOUNTER (OUTPATIENT)
Dept: CARDIOLOGY | Age: 83
Discharge: HOME | End: 2018-12-07
Payer: MEDICARE

## 2018-12-07 DIAGNOSIS — I71.40 ABDOMINAL AORTIC ANEURYSM (AAA) WITHOUT RUPTURE: ICD-10-CM

## 2018-12-07 PROCEDURE — 93978 VASCULAR STUDY: CPT

## 2018-12-12 RX ORDER — CLONIDINE HYDROCHLORIDE 0.1 MG/1
0.1 TABLET ORAL NIGHTLY
Qty: 90 TABLET | Refills: 3 | Status: SHIPPED | OUTPATIENT
Start: 2018-12-12 | End: 2019-12-23

## 2019-06-07 ENCOUNTER — OFFICE VISIT (OUTPATIENT)
Dept: CARDIOLOGY CLINIC | Age: 84
End: 2019-06-07
Payer: MEDICARE

## 2019-06-07 VITALS
HEART RATE: 65 BPM | OXYGEN SATURATION: 98 % | BODY MASS INDEX: 24.96 KG/M2 | WEIGHT: 159 LBS | DIASTOLIC BLOOD PRESSURE: 60 MMHG | SYSTOLIC BLOOD PRESSURE: 130 MMHG | HEIGHT: 67 IN

## 2019-06-07 DIAGNOSIS — E78.2 MIXED HYPERLIPIDEMIA: Primary | ICD-10-CM

## 2019-06-07 DIAGNOSIS — I10 ESSENTIAL HYPERTENSION: ICD-10-CM

## 2019-06-07 DIAGNOSIS — I65.23 BILATERAL CAROTID ARTERY STENOSIS: ICD-10-CM

## 2019-06-07 DIAGNOSIS — I42.9 CARDIOMYOPATHY, UNSPECIFIED TYPE (HCC): ICD-10-CM

## 2019-06-07 DIAGNOSIS — R06.02 SHORTNESS OF BREATH: ICD-10-CM

## 2019-06-07 PROCEDURE — G8427 DOCREV CUR MEDS BY ELIG CLIN: HCPCS | Performed by: INTERNAL MEDICINE

## 2019-06-07 PROCEDURE — G8598 ASA/ANTIPLAT THER USED: HCPCS | Performed by: INTERNAL MEDICINE

## 2019-06-07 PROCEDURE — G8420 CALC BMI NORM PARAMETERS: HCPCS | Performed by: INTERNAL MEDICINE

## 2019-06-07 PROCEDURE — 99214 OFFICE O/P EST MOD 30 MIN: CPT | Performed by: INTERNAL MEDICINE

## 2019-06-07 PROCEDURE — 1036F TOBACCO NON-USER: CPT | Performed by: INTERNAL MEDICINE

## 2019-06-07 PROCEDURE — 1123F ACP DISCUSS/DSCN MKR DOCD: CPT | Performed by: INTERNAL MEDICINE

## 2019-06-07 PROCEDURE — 4040F PNEUMOC VAC/ADMIN/RCVD: CPT | Performed by: INTERNAL MEDICINE

## 2019-06-07 NOTE — PROGRESS NOTES
Place 1 drop into both eyes every 12 hours      losartan (COZAAR) 100 MG tablet Take 1 tablet by mouth  daily (Patient taking differently: Take 1/2 tablet by mouth  daily) 90 tablet 3    acetaminophen (TYLENOL) 325 MG tablet Take 650 mg by mouth nightly       terazosin (HYTRIN) 5 MG capsule Take 1 capsule by mouth nightly 30 capsule 6    famotidine (PEPCID) 20 MG tablet Take 1 tablet by mouth daily (Patient taking differently: Take 20 mg by mouth daily TAKES TWICE A DAY) 60 tablet 6    hydrochlorothiazide (HYDRODIURIL) 25 MG tablet Take 1 tablet by mouth daily. 30 tablet 6    allopurinol (ZYLOPRIM) 300 MG tablet Take 300 mg by mouth daily.  simvastatin (ZOCOR) 20 MG tablet Take 20 mg by mouth nightly.  aspirin 81 MG chewable tablet Take 81 mg by mouth daily. No current facility-administered medications on file prior to visit. Review of Systems:   · Constitutional: there has been no unanticipated weight loss. No change in energy or activity level   · Eyes: No visual changes   · ENT: No Headaches, hearing loss or vertigo. No mouth sores or sore throat. · Cardiovascular: Reviewed in HPI  · Respiratory: No cough or wheezing, no sputum production. No hematemesis. SOB  · Gastrointestinal: No abdominal pain, appetite loss, blood in stools. No change in bowel or bladder habits. · Genitourinary: No nocturia, dysuria, trouble voiding  · Musculoskeletal:  No gait disturbance  · Integumentary: No rash or pruritis. · Neurological: No headache. No change in gait, balance, coordination, mood, affect, memory, mentation, behavior. · Psychiatric: No anxiety or depression  · Endocrine: No malaise or fever  · Hematologic/Lymphatic: No abnormal bruising or bleeding, blood clots or swollen lymph nodes. · Allergic/Immunologic: No nasal congestion or hives.     Physical Examination:    Vitals:    06/07/19 1325   BP: 130/60   Site: Left Upper Arm   Position: Sitting   Cuff Size: Medium Adult   Pulse: 65 SpO2: 98%   Weight: 159 lb (72.1 kg)   Height: 5' 7\" (1.702 m)     Body mass index is 24.9 kg/m². Wt Readings from Last 3 Encounters:   06/07/19 159 lb (72.1 kg)   11/30/18 168 lb (76.2 kg)   07/23/18 166 lb 1 oz (75.3 kg)     BP Readings from Last 3 Encounters:   06/07/19 130/60   11/30/18 (!) 118/54   08/06/18 106/62       Constitutional and General Appearance:  appears stated age  Respiratory:  · Normal excursion and expansion without use of accessory muscles  · Resp Auscultation: Normal breath sounds without dullness  Cardiovascular:  · The apical impulses not displaced  · Heart is regular rate and rhythm with normal S1, positive S4, 2/6 PABLO, reversed split S2  · The carotid upstroke is normal, no bruit noted   · JVP is not elevated  · Peripheral pulses are symmetrical  · There is no clubbing, cyanosis of the extremities  · No edema  · Femoral Arteries: 2+ and equal  · Pedal Pulses: 2+ and equal   Abdomen:  · No masses or tenderness  · Normal bowel sounds  Neurological/Psychiatric:  · Alert and oriented x3  · Moves all extremities well  · Exhibits normal gait balance and coordination    Assessment:  1. 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 Cardiomyopathy: Nonischemic. Cath 2004 min disease   GXT 2007 fixed defects. Stable, no evidence of heart failure by exam today  Echo 2/14> EF 35%. moderate global hypokinesis. reversal of E/A inflow velocities across the mitral valve suggesting impaired left ventricular relaxation. aortic valve is mildly calcified, opens well with normal gradients. Mild-to-moderate aortic regurgitation is present. Pressure half-time is calculated at 770 msec. mild mitral and tricuspid regurgitation with RVSP 30 mmHg. Mild-to-moderate pulmonic regurgitation present.  Upper limits of normal to mildly dilated left atrium with a volume of 66ml  Echo 9/08>  EF 35%, LVH, mild to moderate AI   3. Hypertension: Stable w/ multiple meds   4. Hyperlipemia: Well controlled on Zocor 20mg. Labs 4/11/19: ; TRIG 88; HDL 45; LDL 60   5. Carotid stenosis: Stable. Doppler 6/3/16> 16-49% bilateral      Plan: Will obtain an echo. No med changes. FU 6 months. Scribe's attestation: This note was scribed in the presence of Dr Gay Mace MD by Cassidy Wei RN. The scribe's documentation has been prepared under my direction and personally reviewed by me in its entirety. I confirm that the note above accurately reflects all work, treatment, procedures, and medical decision making performed by me. Thank you for allowing to me to participate in the care of [address] [place] [address]

## 2019-06-07 NOTE — LETTER
Current Outpatient Medications on File Prior to Visit   Medication Sig Dispense Refill    cloNIDine (CATAPRES) 0.1 MG tablet Take 1 tablet by mouth nightly 90 tablet 3    carvedilol (COREG) 25 MG tablet TAKE 1 TABLET BY MOUTH TWO  TIMES DAILY 180 tablet 3    hydrALAZINE (APRESOLINE) 50 MG tablet TAKE 1 AND 1/2 TABLETS BY  MOUTH 2 TIMES DAILY 270 tablet 2    brimonidine-timolol (COMBIGAN) 0.2-0.5 % ophthalmic solution Place 1 drop into both eyes every 12 hours      losartan (COZAAR) 100 MG tablet Take 1 tablet by mouth  daily (Patient taking differently: Take 1/2 tablet by mouth  daily) 90 tablet 3    acetaminophen (TYLENOL) 325 MG tablet Take 650 mg by mouth nightly       terazosin (HYTRIN) 5 MG capsule Take 1 capsule by mouth nightly 30 capsule 6    famotidine (PEPCID) 20 MG tablet Take 1 tablet by mouth daily (Patient taking differently: Take 20 mg by mouth daily TAKES TWICE A DAY) 60 tablet 6    hydrochlorothiazide (HYDRODIURIL) 25 MG tablet Take 1 tablet by mouth daily. 30 tablet 6    allopurinol (ZYLOPRIM) 300 MG tablet Take 300 mg by mouth daily.  simvastatin (ZOCOR) 20 MG tablet Take 20 mg by mouth nightly.  aspirin 81 MG chewable tablet Take 81 mg by mouth daily. No current facility-administered medications on file prior to visit. Review of Systems:   · Constitutional: there has been no unanticipated weight loss. No change in energy or activity level   · Eyes: No visual changes   · ENT: No Headaches, hearing loss or vertigo. No mouth sores or sore throat. · Cardiovascular: Reviewed in HPI  · Respiratory: No cough or wheezing, no sputum production. No hematemesis. SOB  · Gastrointestinal: No abdominal pain, appetite loss, blood in stools. No change in bowel or bladder habits. · Genitourinary: No nocturia, dysuria, trouble voiding  · Musculoskeletal:  No gait disturbance  · Integumentary: No rash or pruritis. · Neurological: No headache. No change in gait, balance, coordination, mood, affect, memory, mentation, behavior. · Psychiatric: No anxiety or depression  · Endocrine: No malaise or fever  · Hematologic/Lymphatic: No abnormal bruising or bleeding, blood clots or swollen lymph nodes. · Allergic/Immunologic: No nasal congestion or hives. Physical Examination:    Vitals:    06/07/19 1325   BP: 130/60   Site: Left Upper Arm   Position: Sitting   Cuff Size: Medium Adult   Pulse: 65   SpO2: 98%   Weight: 159 lb (72.1 kg)   Height: 5' 7\" (1.702 m)     Body mass index is 24.9 kg/m². Wt Readings from Last 3 Encounters:   06/07/19 159 lb (72.1 kg)   11/30/18 168 lb (76.2 kg)   07/23/18 166 lb 1 oz (75.3 kg)     BP Readings from Last 3 Encounters:   06/07/19 130/60   11/30/18 (!) 118/54   08/06/18 106/62       Constitutional and General Appearance:  appears stated age  Respiratory:  · Normal excursion and expansion without use of accessory muscles  · Resp Auscultation: Normal breath sounds without dullness  Cardiovascular:  · The apical impulses not displaced  · Heart is regular rate and rhythm with normal S1, positive S4, 2/6 PABLO, reversed split S2  · The carotid upstroke is normal, no bruit noted   · JVP is not elevated  · Peripheral pulses are symmetrical  · There is no clubbing, cyanosis of the extremities  · No edema  · Femoral Arteries: 2+ and equal  · Pedal Pulses: 2+ and equal   Abdomen:  · No masses or tenderness  · Normal bowel sounds  Neurological/Psychiatric:  · Alert and oriented x3  · Moves all extremities well  · Exhibits normal gait balance and coordination    Assessment:  1. Abdominal aneurysm without mention of rupture: no longer seeing Dr Linda Gonzalez  CT 5/13> 3.6cm, screening 2/21/17: 3.4cm, 12/18 US AAA 3.7cm   2. Cardiomyopathy: Nonischemic. Cath 2004 min disease   GXT 2007 fixed defects.    Stable, no evidence of heart failure by exam today

## 2019-06-14 ENCOUNTER — HOSPITAL ENCOUNTER (OUTPATIENT)
Dept: CARDIOLOGY | Age: 84
Discharge: HOME | End: 2019-06-14
Payer: MEDICARE

## 2019-06-14 DIAGNOSIS — I42.9 CARDIOMYOPATHY, UNSPECIFIED TYPE (HCC): ICD-10-CM

## 2019-06-14 DIAGNOSIS — R06.02 SHORTNESS OF BREATH: ICD-10-CM

## 2019-06-14 LAB
LEFT VENTRICULAR EJECTION FRACTION HIGH VALUE: 55 %
LEFT VENTRICULAR EJECTION FRACTION MODE: NORMAL
LV EF: 50 %
LV EF: 53 %
LVEF MODALITY: NORMAL

## 2019-06-14 PROCEDURE — 93306 TTE W/DOPPLER COMPLETE: CPT

## 2019-07-23 RX ORDER — LOSARTAN POTASSIUM 100 MG/1
TABLET ORAL
Qty: 90 TABLET | Refills: 3 | Status: SHIPPED | OUTPATIENT
Start: 2019-07-23 | End: 2019-12-09 | Stop reason: SDUPTHER

## 2019-10-02 RX ORDER — HYDRALAZINE HYDROCHLORIDE 50 MG/1
TABLET, FILM COATED ORAL
Qty: 270 TABLET | Refills: 2 | Status: SHIPPED | OUTPATIENT
Start: 2019-10-02 | End: 2020-04-22

## 2019-10-30 RX ORDER — CARVEDILOL 25 MG/1
TABLET ORAL
Qty: 180 TABLET | Refills: 3 | Status: SHIPPED | OUTPATIENT
Start: 2019-10-30 | End: 2020-08-21

## 2019-12-09 RX ORDER — LOSARTAN POTASSIUM 100 MG/1
TABLET ORAL
Qty: 90 TABLET | Refills: 3 | Status: SHIPPED | OUTPATIENT
Start: 2019-12-09 | End: 2020-12-17

## 2019-12-23 RX ORDER — CLONIDINE HYDROCHLORIDE 0.1 MG/1
0.1 TABLET ORAL NIGHTLY
Qty: 90 TABLET | Refills: 1 | Status: SHIPPED | OUTPATIENT
Start: 2019-12-23 | End: 2020-06-26

## 2020-01-10 ENCOUNTER — OFFICE VISIT (OUTPATIENT)
Dept: CARDIOLOGY CLINIC | Age: 85
End: 2020-01-10
Payer: MEDICARE

## 2020-01-10 VITALS
HEIGHT: 67 IN | BODY MASS INDEX: 24.8 KG/M2 | OXYGEN SATURATION: 97 % | SYSTOLIC BLOOD PRESSURE: 98 MMHG | WEIGHT: 158 LBS | DIASTOLIC BLOOD PRESSURE: 48 MMHG | HEART RATE: 66 BPM

## 2020-01-10 PROCEDURE — 1123F ACP DISCUSS/DSCN MKR DOCD: CPT | Performed by: INTERNAL MEDICINE

## 2020-01-10 PROCEDURE — G8427 DOCREV CUR MEDS BY ELIG CLIN: HCPCS | Performed by: INTERNAL MEDICINE

## 2020-01-10 PROCEDURE — 99214 OFFICE O/P EST MOD 30 MIN: CPT | Performed by: INTERNAL MEDICINE

## 2020-01-10 PROCEDURE — 4040F PNEUMOC VAC/ADMIN/RCVD: CPT | Performed by: INTERNAL MEDICINE

## 2020-01-10 PROCEDURE — 1036F TOBACCO NON-USER: CPT | Performed by: INTERNAL MEDICINE

## 2020-01-10 PROCEDURE — G8484 FLU IMMUNIZE NO ADMIN: HCPCS | Performed by: INTERNAL MEDICINE

## 2020-01-10 PROCEDURE — G8420 CALC BMI NORM PARAMETERS: HCPCS | Performed by: INTERNAL MEDICINE

## 2020-01-10 PROCEDURE — 93000 ELECTROCARDIOGRAM COMPLETE: CPT | Performed by: INTERNAL MEDICINE

## 2020-01-10 NOTE — LETTER
 losartan (COZAAR) 100 MG tablet TAKE 1 TABLET BY MOUTH  DAILY (Patient taking differently: 50 mg TAKE 1/2 TABLET BY MOUTH  DAILY) 90 tablet 3    carvedilol (COREG) 25 MG tablet TAKE 1 TABLET BY MOUTH TWO  TIMES DAILY 180 tablet 3    hydrALAZINE (APRESOLINE) 50 MG tablet TAKE 1 AND 1/2 TABLETS BY  MOUTH 2 TIMES DAILY 270 tablet 2    brimonidine-timolol (COMBIGAN) 0.2-0.5 % ophthalmic solution Place 1 drop into both eyes every 12 hours      acetaminophen (TYLENOL) 325 MG tablet Take 650 mg by mouth nightly       terazosin (HYTRIN) 5 MG capsule Take 1 capsule by mouth nightly 30 capsule 6    famotidine (PEPCID) 20 MG tablet Take 1 tablet by mouth daily (Patient taking differently: Take 20 mg by mouth daily TAKES TWICE A DAY) 60 tablet 6    hydrochlorothiazide (HYDRODIURIL) 25 MG tablet Take 1 tablet by mouth daily. 30 tablet 6    allopurinol (ZYLOPRIM) 300 MG tablet Take 300 mg by mouth daily.  simvastatin (ZOCOR) 20 MG tablet Take 20 mg by mouth nightly.  aspirin 81 MG chewable tablet Take 81 mg by mouth daily. No current facility-administered medications on file prior to visit. Review of Systems:   · Constitutional: there has been no unanticipated weight loss. No change in energy or activity level   · Eyes: No visual changes   · ENT: No Headaches, hearing loss or vertigo. No mouth sores or sore throat. · Cardiovascular: Reviewed in HPI  · Respiratory: No cough or wheezing, no sputum production. No hematemesis. · Gastrointestinal: No abdominal pain, appetite loss, blood in stools. No change in bowel or bladder habits. · Genitourinary: No nocturia, dysuria, trouble voiding  · Musculoskeletal:  No gait disturbance  · Integumentary: No rash or pruritis. · Neurological: No headache. No change in gait, balance, coordination, mood, affect, memory, mentation, behavior.   · Psychiatric: No anxiety or depression  · Endocrine: No malaise or fever IVCD. Grade I diastolic dysfunction with normal LV filling pressures. E/e\"=12.5. Trivial mitral regurgitation. Aortic valve appears sclerotic but opens adequately. Mild aortic regurgitation. Trivial tricuspid regurgitation. Mild pulmonic regurgitation present. Estimated pulmonary artery systolic pressure is normal at 25-30 mmHg assuming a right atrial pressure of 3 mmHg  Echo 2/14> EF 35%. moderate global hypokinesis. reversal of E/A inflow velocities across the mitral valve suggesting impaired left ventricular relaxation. aortic valve is mildly calcified, opens well with normal gradients. Mild-to-moderate aortic regurgitation is present. Pressure half-time is calculated at 770 msec. mild mitral and tricuspid regurgitation with RVSP 30 mmHg. Mild-to-moderate pulmonic regurgitation present. Upper limits of normal to mildly dilated left atrium with a volume of 66ml  Echo 9/08>  EF 35%, LVH, mild to moderate AI   3. Hypertension: Stable w/ multiple meds   4. Hyperlipemia: Well controlled on Zocor 20mg. Labs 12/16/19: ; TRIG 95; HDL 44; LDL 55   5. Carotid stenosis: Stable. Doppler 6/3/16> 16-49% bilateral     EKG>normal sinus rhythm, LBBB    Plan:   Mr Chloé Ortiz appears stable. No med changes. Encouraged to stay active. FU 6 months. Scribe's attestation: This note was scribed in the presence of Dr Kong Villalta MD by Reginaldo Hair RN. The scribe's documentation has been prepared under my direction and personally reviewed by me in its entirety. I confirm that the note above accurately reflects all work, treatment, procedures, and medical decision making performed by me. Thank you for allowing to me to participate in the care of [address] [place] [address]. If you have questions, please do not hesitate to call me. I look forward to following Virginia Meyers along with you.     Sincerely,        Cassie Mar MD

## 2020-01-10 NOTE — PROGRESS NOTES
[facility]   Cardiac Evaluation      Patient: Iris Arizmendi  YOB: 1931  Date: 1/10/20     Chief Complaint   Patient presents with    Hypertension    Hyperlipidemia    Cardiomyopathy      Referring provider: Claudia Loomis    History of Present Illness:   Mr. Dakota Burleson comes to the office today for follow up. His history includes nonischemic cardiomyopathy, hypertension, and hyperlipidemia. AAA is 3.7cm per US 12/18. Mr. Dakota Burleson states he feels well. He denies exertional chest pain, palpitations, dizziness, edema. He is very attentive to his wife and takes care of his yard and home. He takes walks, as well. Federica Abreu has lost weight but staes that he eats well. Past Medical History:   has a past medical history of Arthritis, Blind right eye, Cardiomyopathy ([address] Gastric ulcer, Glaucoma, Gout, Hyperlipidemia, Hypertension, and PVD (peripheral vascular disease) ([address] Surgical History:   has a past surgical history that includes Diagnostic Cardiac Cath Lab Procedure; hernia repair; External ear surgery (Left, 08/31/2016); Knee arthroscopy; Vasectomy; and Inguinal hernia repair (Right, 07/23/2018). Social History:   reports that he quit smoking about 63 years ago. He has never used smokeless tobacco. He reports that he does not drink alcohol or use drugs.      Family History:  Son with AR      Current Outpatient Medications on File Prior to Visit   Medication Sig Dispense Refill    cloNIDine (CATAPRES) 0.1 MG tablet TAKE 1 TABLET BY MOUTH  NIGHTLY 90 tablet 1    losartan (COZAAR) 100 MG tablet TAKE 1 TABLET BY MOUTH  DAILY (Patient taking differently: 50 mg TAKE 1/2 TABLET BY MOUTH  DAILY) 90 tablet 3    carvedilol (COREG) 25 MG tablet TAKE 1 TABLET BY MOUTH TWO  TIMES DAILY 180 tablet 3    hydrALAZINE (APRESOLINE) 50 MG tablet TAKE 1 AND 1/2 TABLETS BY  MOUTH 2 TIMES DAILY 270 tablet 2    brimonidine-timolol (COMBIGAN) 0.2-0.5 % ophthalmic solution Place 1 drop into both eyes every 12 hours      acetaminophen (TYLENOL) 325 MG tablet Take 650 mg by mouth nightly       terazosin (HYTRIN) 5 MG capsule Take 1 capsule by mouth nightly 30 capsule 6    famotidine (PEPCID) 20 MG tablet Take 1 tablet by mouth daily (Patient taking differently: Take 20 mg by mouth daily TAKES TWICE A DAY) 60 tablet 6    hydrochlorothiazide (HYDRODIURIL) 25 MG tablet Take 1 tablet by mouth daily. 30 tablet 6    allopurinol (ZYLOPRIM) 300 MG tablet Take 300 mg by mouth daily.  simvastatin (ZOCOR) 20 MG tablet Take 20 mg by mouth nightly.  aspirin 81 MG chewable tablet Take 81 mg by mouth daily. No current facility-administered medications on file prior to visit. Review of Systems:   · Constitutional: there has been no unanticipated weight loss. No change in energy or activity level   · Eyes: No visual changes   · ENT: No Headaches, hearing loss or vertigo. No mouth sores or sore throat. · Cardiovascular: Reviewed in HPI  · Respiratory: No cough or wheezing, no sputum production. No hematemesis. · Gastrointestinal: No abdominal pain, appetite loss, blood in stools. No change in bowel or bladder habits. · Genitourinary: No nocturia, dysuria, trouble voiding  · Musculoskeletal:  No gait disturbance  · Integumentary: No rash or pruritis. · Neurological: No headache. No change in gait, balance, coordination, mood, affect, memory, mentation, behavior. · Psychiatric: No anxiety or depression  · Endocrine: No malaise or fever  · Hematologic/Lymphatic: No abnormal bruising or bleeding, blood clots or swollen lymph nodes. · Allergic/Immunologic: No nasal congestion or hives. Physical Examination:    Vitals:    01/10/20 1302   BP: (!) 98/48   Site: Left Upper Arm   Position: Sitting   Cuff Size: Medium Adult   Pulse: 66   SpO2: 97%   Weight: 158 lb (71.7 kg)   Height: 5' 7\" (1.702 m)     Body mass index is 24.75 kg/m².      Wt Readings from Last 3 Encounters:   01/10/20 158 left ventricular relaxation. aortic valve is mildly calcified, opens well with normal gradients. Mild-to-moderate aortic regurgitation is present. Pressure half-time is calculated at 770 msec. mild mitral and tricuspid regurgitation with RVSP 30 mmHg. Mild-to-moderate pulmonic regurgitation present. Upper limits of normal to mildly dilated left atrium with a volume of 66ml  Echo 9/08>  EF 35%, LVH, mild to moderate AI   3. Hypertension: Stable w/ multiple meds   4. Hyperlipemia: Well controlled on Zocor 20mg. Labs 12/16/19: ; TRIG 95; HDL 44; LDL 55   5. Carotid stenosis: Stable. Doppler 6/3/16> 16-49% bilateral     EKG>normal sinus rhythm, LBBB    Plan:   Mr Roslaie Wagoner appears stable. No med changes. Encouraged to stay active. FU 6 months. Scribe's attestation: This note was scribed in the presence of Dr Madison Taylor MD by Richy Mak RN. The scribe's documentation has been prepared under my direction and personally reviewed by me in its entirety. I confirm that the note above accurately reflects all work, treatment, procedures, and medical decision making performed by me. Thank you for allowing to me to participate in the care of [address] [place] [address]

## 2020-02-05 NOTE — PROGRESS NOTES
Name_______________________________________Printed:____________________  Date and time of surgery_2/11/2020______0800________________Arrival Time:_0630 [facility]_______________   1. The instructions given regarding when and if a patient needs to stop oral intake prior to surgery varies. Follow the specific instructions you were given                  __x_Nothing to eat or to drink after Midnight the night before.                             ____Endoscopy patient follow your DRS instructions-generally you will be doing a part of the prep after Midnight                   ____Carbo loading or ERAS instructions will be given to select patients-if you have been given those instructions -please do the following                           The evening before your surgery after dinner before midnight drink 40 ounces of gatorade. If you are diabetic use sugar free. The morning of surgery drink 40 ounces of water. This needs to be finished 3 hours prior to your surgery start time. 2. Take the following pills with a small sip of water on the morning of surgery_carvedilol, hydralazine, apresoline__________________________________________________                  Do not take blood pressure medications ending in pril or sartan the silvina prior to surgery or the morning of surgery_   3. Aspirin, Ibuprofen, Advil, Naproxen, Vitamin E and other Anti-inflammatory products and supplements should be stopped for 5 -7days before surgery or as directed by your physician. 4. Check with your Doctor regarding stopping Plavix, Coumadin,Eliquis, Lovenox,Effient,Pradaxa,Xarelto, Fragmin or other blood thinners and follow their instructions. 5. Do not smoke, and do not drink any alcoholic beverages 24 hours prior to surgery. This includes NA Beer. Refrain from the usage of any recreational drugs. 6. You may brush your teeth and gargle the morning of surgery. DO NOT SWALLOW WATER   7.  You MUST make arrangements for a responsible adult to stay on site while you are here and take you home after your surgery. You will not be allowed to leave alone or drive yourself home. It is strongly suggested someone stay with you the first 24 hrs. Your surgery will be cancelled if you do not have a ride home. 8. A parent/legal guardian must accompany a child scheduled for surgery and plan to stay at the hospital until the child is discharged. Please do not bring other children with you. 9. Please wear simple, loose fitting clothing to the hospital.  Cordell John not bring valuables (money, credit cards, checkbooks, etc.) Do not wear any makeup (including no eye makeup) or nail polish on your fingers or toes. 10. DO NOT wear any jewelry or piercings on day of surgery. All body piercing jewelry must be removed. 11. If you have ___dentures, they will be removed before going to the OR; we will provide you a container. If you wear ___contact lenses or _x_glasses, they will be removed; please bring a case for them. 12. Please see your family doctor/pediatrician for a history & physical and/or concerning medications. Bring any test results/reports from your physician's office. PCP__________________Phone___________H&P Appt. Date________             13 If you  have a Living Will and Durable Power of  for Healthcare, please bring in a copy. 15. Notify your Surgeon if you develop any illness between now and surgery  time, cough, cold, fever, sore throat, nausea, vomiting, etc.  Please notify your surgeon if you experience dizziness, shortness of breath or blurred vision between now & the time of your surgery             15. DO NOT shave your operative site 96 hours prior to surgery. For face & neck surgery, men may use an electric razor 48 hours prior to surgery. 16. Shower the night before or morning of surgery using an antibacterial soap or as you have been instructed.              17. To provide excellent care visitors will be limited to one in the room at any given time. 18.  Please bring picture ID and insurance card. 19.  Visit our web site for additional information:  [email or web address]              20.During flu season no children under the age of 15 are permitted in the hospital for the safety of all patients. 21. If you take a long acting insulin in the evening only  take half of your usual  dose the night  before your procedure              22. If you use a c-pap please bring DOS if staying overnight,             23.For your convenience [address] [place] [address] has a pharmacy on site to fill your prescriptions. 24. If you use oxygen and have a portable tank please bring it  with you the DOS             25. Bring a complete list of all your medications with name and dose include any supplements. 26. Other__________________________________________   *Please call pre admission testing if you any further questions   Jocelynn Patrick [facility] [address]. [facility]  [phone]   [address] [place] [address]       All above information reviewed with patient in person or by phone. Patient verbalizes understanding. All questions and concerns addressed.                                                                                                  Patient/Rep__patient__________________                                                                                                                                    PRE OP INSTRUCTIONS

## 2020-02-11 ENCOUNTER — ANESTHESIA (OUTPATIENT)
Dept: OPERATING ROOM | Age: 85
End: 2020-02-11
Payer: MEDICARE

## 2020-02-11 ENCOUNTER — HOSPITAL ENCOUNTER (OUTPATIENT)
Age: 85
Setting detail: OUTPATIENT SURGERY
Discharge: HOME | End: 2020-02-11
Attending: PLASTIC SURGERY | Admitting: PLASTIC SURGERY
Payer: MEDICARE

## 2020-02-11 ENCOUNTER — ANESTHESIA EVENT (OUTPATIENT)
Dept: OPERATING ROOM | Age: 85
End: 2020-02-11
Payer: MEDICARE

## 2020-02-11 VITALS
OXYGEN SATURATION: 100 % | TEMPERATURE: 96.4 F | SYSTOLIC BLOOD PRESSURE: 143 MMHG | DIASTOLIC BLOOD PRESSURE: 64 MMHG | RESPIRATION RATE: 20 BRPM

## 2020-02-11 VITALS
HEIGHT: 67 IN | HEART RATE: 56 BPM | DIASTOLIC BLOOD PRESSURE: 61 MMHG | OXYGEN SATURATION: 96 % | TEMPERATURE: 98 F | WEIGHT: 152 LBS | SYSTOLIC BLOOD PRESSURE: 149 MMHG | BODY MASS INDEX: 23.86 KG/M2 | RESPIRATION RATE: 16 BRPM

## 2020-02-11 PROCEDURE — 2500000003 HC RX 250 WO HCPCS: Performed by: PLASTIC SURGERY

## 2020-02-11 PROCEDURE — 3700000001 HC ADD 15 MINUTES (ANESTHESIA): Performed by: PLASTIC SURGERY

## 2020-02-11 PROCEDURE — 7100000001 HC PACU RECOVERY - ADDTL 15 MIN: Performed by: PLASTIC SURGERY

## 2020-02-11 PROCEDURE — 2709999900 HC NON-CHARGEABLE SUPPLY: Performed by: PLASTIC SURGERY

## 2020-02-11 PROCEDURE — 6370000000 HC RX 637 (ALT 250 FOR IP): Performed by: PLASTIC SURGERY

## 2020-02-11 PROCEDURE — 3600000002 HC SURGERY LEVEL 2 BASE: Performed by: PLASTIC SURGERY

## 2020-02-11 PROCEDURE — 88331 PATH CONSLTJ SURG 1 BLK 1SPC: CPT

## 2020-02-11 PROCEDURE — 7100000000 HC PACU RECOVERY - FIRST 15 MIN: Performed by: PLASTIC SURGERY

## 2020-02-11 PROCEDURE — 2500000003 HC RX 250 WO HCPCS: Performed by: NURSE ANESTHETIST, CERTIFIED REGISTERED

## 2020-02-11 PROCEDURE — 7100000011 HC PHASE II RECOVERY - ADDTL 15 MIN: Performed by: PLASTIC SURGERY

## 2020-02-11 PROCEDURE — 6360000002 HC RX W HCPCS: Performed by: NURSE ANESTHETIST, CERTIFIED REGISTERED

## 2020-02-11 PROCEDURE — 3600000012 HC SURGERY LEVEL 2 ADDTL 15MIN: Performed by: PLASTIC SURGERY

## 2020-02-11 PROCEDURE — 6360000002 HC RX W HCPCS: Performed by: PLASTIC SURGERY

## 2020-02-11 PROCEDURE — 88332 PATH CONSLTJ SURG EA ADD BLK: CPT

## 2020-02-11 PROCEDURE — 3700000000 HC ANESTHESIA ATTENDED CARE: Performed by: PLASTIC SURGERY

## 2020-02-11 PROCEDURE — 2580000003 HC RX 258: Performed by: PLASTIC SURGERY

## 2020-02-11 PROCEDURE — 7100000010 HC PHASE II RECOVERY - FIRST 15 MIN: Performed by: PLASTIC SURGERY

## 2020-02-11 PROCEDURE — 88305 TISSUE EXAM BY PATHOLOGIST: CPT

## 2020-02-11 RX ORDER — CIPROFLOXACIN 2 MG/ML
400 INJECTION, SOLUTION INTRAVENOUS EVERY 12 HOURS
Status: DISCONTINUED | OUTPATIENT
Start: 2020-02-11 | End: 2020-02-11 | Stop reason: HOSPADM

## 2020-02-11 RX ORDER — MEPERIDINE HYDROCHLORIDE 25 MG/ML
12.5 INJECTION INTRAMUSCULAR; INTRAVENOUS; SUBCUTANEOUS EVERY 5 MIN PRN
Status: DISCONTINUED | OUTPATIENT
Start: 2020-02-11 | End: 2020-02-11 | Stop reason: HOSPADM

## 2020-02-11 RX ORDER — LABETALOL HYDROCHLORIDE 5 MG/ML
5 INJECTION, SOLUTION INTRAVENOUS EVERY 10 MIN PRN
Status: DISCONTINUED | OUTPATIENT
Start: 2020-02-11 | End: 2020-02-11 | Stop reason: HOSPADM

## 2020-02-11 RX ORDER — PROPOFOL 10 MG/ML
INJECTION, EMULSION INTRAVENOUS PRN
Status: DISCONTINUED | OUTPATIENT
Start: 2020-02-11 | End: 2020-02-11 | Stop reason: SDUPTHER

## 2020-02-11 RX ORDER — ONDANSETRON 2 MG/ML
4 INJECTION INTRAMUSCULAR; INTRAVENOUS
Status: DISCONTINUED | OUTPATIENT
Start: 2020-02-11 | End: 2020-02-11 | Stop reason: HOSPADM

## 2020-02-11 RX ORDER — ROCURONIUM BROMIDE 10 MG/ML
INJECTION, SOLUTION INTRAVENOUS PRN
Status: DISCONTINUED | OUTPATIENT
Start: 2020-02-11 | End: 2020-02-11 | Stop reason: SDUPTHER

## 2020-02-11 RX ORDER — LIDOCAINE HYDROCHLORIDE 20 MG/ML
INJECTION, SOLUTION EPIDURAL; INFILTRATION; INTRACAUDAL; PERINEURAL PRN
Status: DISCONTINUED | OUTPATIENT
Start: 2020-02-11 | End: 2020-02-11 | Stop reason: SDUPTHER

## 2020-02-11 RX ORDER — CIPROFLOXACIN 500 MG/1
500 TABLET, FILM COATED ORAL 2 TIMES DAILY
Qty: 14 TABLET | Refills: 0 | Status: SHIPPED | OUTPATIENT
Start: 2020-02-11 | End: 2020-02-18

## 2020-02-11 RX ORDER — SODIUM CHLORIDE, SODIUM LACTATE, POTASSIUM CHLORIDE, CALCIUM CHLORIDE 600; 310; 30; 20 MG/100ML; MG/100ML; MG/100ML; MG/100ML
INJECTION, SOLUTION INTRAVENOUS CONTINUOUS
Status: DISCONTINUED | OUTPATIENT
Start: 2020-02-11 | End: 2020-02-11 | Stop reason: HOSPADM

## 2020-02-11 RX ORDER — EPHEDRINE SULFATE/0.9% NACL/PF 50 MG/5 ML
SYRINGE (ML) INTRAVENOUS PRN
Status: DISCONTINUED | OUTPATIENT
Start: 2020-02-11 | End: 2020-02-11 | Stop reason: SDUPTHER

## 2020-02-11 RX ORDER — ONDANSETRON 2 MG/ML
INJECTION INTRAMUSCULAR; INTRAVENOUS PRN
Status: DISCONTINUED | OUTPATIENT
Start: 2020-02-11 | End: 2020-02-11 | Stop reason: SDUPTHER

## 2020-02-11 RX ORDER — LIDOCAINE HYDROCHLORIDE AND EPINEPHRINE 10; 10 MG/ML; UG/ML
INJECTION, SOLUTION INFILTRATION; PERINEURAL
Status: COMPLETED | OUTPATIENT
Start: 2020-02-11 | End: 2020-02-11

## 2020-02-11 RX ORDER — HYDRALAZINE HYDROCHLORIDE 20 MG/ML
5 INJECTION INTRAMUSCULAR; INTRAVENOUS EVERY 10 MIN PRN
Status: DISCONTINUED | OUTPATIENT
Start: 2020-02-11 | End: 2020-02-11 | Stop reason: HOSPADM

## 2020-02-11 RX ORDER — HYDROMORPHONE HCL 110MG/55ML
0.5 PATIENT CONTROLLED ANALGESIA SYRINGE INTRAVENOUS EVERY 5 MIN PRN
Status: DISCONTINUED | OUTPATIENT
Start: 2020-02-11 | End: 2020-02-11 | Stop reason: HOSPADM

## 2020-02-11 RX ORDER — HYDROCODONE BITARTRATE AND ACETAMINOPHEN 5; 325 MG/1; MG/1
1 TABLET ORAL EVERY 6 HOURS PRN
Qty: 20 TABLET | Refills: 0 | Status: SHIPPED | OUTPATIENT
Start: 2020-02-11 | End: 2020-02-16

## 2020-02-11 RX ORDER — BACITRACIN ZINC AND POLYMYXIN B SULFATE 500; 1000 [USP'U]/G; [USP'U]/G
OINTMENT TOPICAL
Status: COMPLETED | OUTPATIENT
Start: 2020-02-11 | End: 2020-02-11

## 2020-02-11 RX ORDER — FENTANYL CITRATE 50 UG/ML
INJECTION, SOLUTION INTRAMUSCULAR; INTRAVENOUS PRN
Status: DISCONTINUED | OUTPATIENT
Start: 2020-02-11 | End: 2020-02-11 | Stop reason: SDUPTHER

## 2020-02-11 RX ORDER — OXYCODONE HYDROCHLORIDE AND ACETAMINOPHEN 5; 325 MG/1; MG/1
1 TABLET ORAL
Status: DISCONTINUED | OUTPATIENT
Start: 2020-02-11 | End: 2020-02-11 | Stop reason: HOSPADM

## 2020-02-11 RX ORDER — SUCCINYLCHOLINE/SOD CL,ISO/PF 200MG/10ML
SYRINGE (ML) INTRAVENOUS PRN
Status: DISCONTINUED | OUTPATIENT
Start: 2020-02-11 | End: 2020-02-11 | Stop reason: SDUPTHER

## 2020-02-11 RX ORDER — DEXAMETHASONE SODIUM PHOSPHATE 4 MG/ML
INJECTION, SOLUTION INTRA-ARTICULAR; INTRALESIONAL; INTRAMUSCULAR; INTRAVENOUS; SOFT TISSUE PRN
Status: DISCONTINUED | OUTPATIENT
Start: 2020-02-11 | End: 2020-02-11 | Stop reason: SDUPTHER

## 2020-02-11 RX ORDER — LIDOCAINE HYDROCHLORIDE 10 MG/ML
0.5 INJECTION, SOLUTION EPIDURAL; INFILTRATION; INTRACAUDAL; PERINEURAL ONCE
Status: COMPLETED | OUTPATIENT
Start: 2020-02-11 | End: 2020-02-11

## 2020-02-11 RX ADMIN — Medication 10 MG: at 08:37

## 2020-02-11 RX ADMIN — Medication 10 MG: at 08:45

## 2020-02-11 RX ADMIN — SODIUM CHLORIDE, POTASSIUM CHLORIDE, SODIUM LACTATE AND CALCIUM CHLORIDE: 600; 310; 30; 20 INJECTION, SOLUTION INTRAVENOUS at 07:03

## 2020-02-11 RX ADMIN — Medication 120 MG: at 08:06

## 2020-02-11 RX ADMIN — ROCURONIUM BROMIDE 5 MG: 10 INJECTION, SOLUTION INTRAVENOUS at 08:06

## 2020-02-11 RX ADMIN — PROPOFOL 80 MG: 10 INJECTION, EMULSION INTRAVENOUS at 08:06

## 2020-02-11 RX ADMIN — Medication 10 MG: at 08:28

## 2020-02-11 RX ADMIN — CIPROFLOXACIN 400 MG: 2 INJECTION, SOLUTION INTRAVENOUS at 07:04

## 2020-02-11 RX ADMIN — FENTANYL CITRATE 50 MCG: 50 INJECTION, SOLUTION INTRAMUSCULAR; INTRAVENOUS at 08:00

## 2020-02-11 RX ADMIN — PROPOFOL 20 MG: 10 INJECTION, EMULSION INTRAVENOUS at 09:34

## 2020-02-11 RX ADMIN — ONDANSETRON 4 MG: 2 INJECTION INTRAMUSCULAR; INTRAVENOUS at 08:15

## 2020-02-11 RX ADMIN — LIDOCAINE HYDROCHLORIDE 60 MG: 20 INJECTION, SOLUTION EPIDURAL; INFILTRATION; INTRACAUDAL; PERINEURAL at 08:06

## 2020-02-11 RX ADMIN — LIDOCAINE HYDROCHLORIDE 0.2 ML: 10 INJECTION, SOLUTION EPIDURAL; INFILTRATION; INTRACAUDAL; PERINEURAL at 07:03

## 2020-02-11 RX ADMIN — DEXAMETHASONE SODIUM PHOSPHATE 4 MG: 4 INJECTION, SOLUTION INTRAMUSCULAR; INTRAVENOUS at 08:15

## 2020-02-11 RX ADMIN — Medication 10 MG: at 09:00

## 2020-02-11 ASSESSMENT — PULMONARY FUNCTION TESTS
PIF_VALUE: 15
PIF_VALUE: 15
PIF_VALUE: 0
PIF_VALUE: 12
PIF_VALUE: 17
PIF_VALUE: 0
PIF_VALUE: 0
PIF_VALUE: 15
PIF_VALUE: 1
PIF_VALUE: 16
PIF_VALUE: 18
PIF_VALUE: 17
PIF_VALUE: 20
PIF_VALUE: 1
PIF_VALUE: 15
PIF_VALUE: 14
PIF_VALUE: 17
PIF_VALUE: 17
PIF_VALUE: 15
PIF_VALUE: 17
PIF_VALUE: 17
PIF_VALUE: 12
PIF_VALUE: 16
PIF_VALUE: 17
PIF_VALUE: 15
PIF_VALUE: 17
PIF_VALUE: 15
PIF_VALUE: 17
PIF_VALUE: 15
PIF_VALUE: 17
PIF_VALUE: 16
PIF_VALUE: 13
PIF_VALUE: 17
PIF_VALUE: 15
PIF_VALUE: 2
PIF_VALUE: 17
PIF_VALUE: 17
PIF_VALUE: 13
PIF_VALUE: 17
PIF_VALUE: 18
PIF_VALUE: 17
PIF_VALUE: 16
PIF_VALUE: 17
PIF_VALUE: 15
PIF_VALUE: 3
PIF_VALUE: 15
PIF_VALUE: 13
PIF_VALUE: 18
PIF_VALUE: 17
PIF_VALUE: 15
PIF_VALUE: 15
PIF_VALUE: 17
PIF_VALUE: 3
PIF_VALUE: 17
PIF_VALUE: 2
PIF_VALUE: 17
PIF_VALUE: 19
PIF_VALUE: 17
PIF_VALUE: 15
PIF_VALUE: 16
PIF_VALUE: 5
PIF_VALUE: 15
PIF_VALUE: 17
PIF_VALUE: 17
PIF_VALUE: 15
PIF_VALUE: 16
PIF_VALUE: 15
PIF_VALUE: 17
PIF_VALUE: 5
PIF_VALUE: 17
PIF_VALUE: 0
PIF_VALUE: 12
PIF_VALUE: 17
PIF_VALUE: 2
PIF_VALUE: 15
PIF_VALUE: 3
PIF_VALUE: 17
PIF_VALUE: 18
PIF_VALUE: 15
PIF_VALUE: 13
PIF_VALUE: 17
PIF_VALUE: 16
PIF_VALUE: 17
PIF_VALUE: 13
PIF_VALUE: 15

## 2020-02-11 ASSESSMENT — PAIN - FUNCTIONAL ASSESSMENT: PAIN_FUNCTIONAL_ASSESSMENT: 0-10

## 2020-02-11 NOTE — ANESTHESIA PRE PROCEDURE
Department of Anesthesiology  Preprocedure Note       Name:  Paige Martinez   Age:  80 y.o.  :  1931                                          MRN:  [id number]         Date:  2020      Surgeon: Christa Boast):  Tracy Arias MD    Procedure: WIDE EXCISION SQUAMOUS CELL CARCINOMA LEFT CROWN OF HEAD WITH FROZEN SECTION CONTROL OF MARGINS AND IMMEDIATE RECONSTRUCTION (N/A )    Medications prior to admission:   Prior to Admission medications    Medication Sig Start Date End Date Taking? Authorizing Provider   cloNIDine (CATAPRES) 0.1 MG tablet TAKE 1 TABLET BY MOUTH  NIGHTLY 19  Yes Buzz Phan MD   losartan (COZAAR) 100 MG tablet TAKE 1 TABLET BY MOUTH  DAILY  Patient taking differently: 50 mg TAKE 1/2 TABLET BY MOUTH  DAILY 19  Yes Buzz Phan MD   carvedilol (COREG) 25 MG tablet TAKE 1 TABLET BY MOUTH TWO  TIMES DAILY 10/30/19  Yes Buzz Phan MD   hydrALAZINE (APRESOLINE) 50 MG tablet TAKE 1 AND 1/2 TABLETS BY  MOUTH 2 TIMES DAILY 10/2/19  Yes Buzz Phan MD   brimonidine-timolol (COMBIGAN) 0.2-0.5 % ophthalmic solution Place 1 drop into both eyes every 12 hours   Yes Historical Provider, MD   terazosin (HYTRIN) 5 MG capsule Take 1 capsule by mouth nightly 5/4/15  Yes Buzz Phan MD   famotidine (PEPCID) 20 MG tablet Take 1 tablet by mouth daily  Patient taking differently: Take 20 mg by mouth daily TAKES TWICE A DAY 5/4/15  Yes Buzz Phan MD   hydrochlorothiazide (HYDRODIURIL) 25 MG tablet Take 1 tablet by mouth daily. 11/10/14  Yes Buzz Phan MD   allopurinol (ZYLOPRIM) 300 MG tablet Take 300 mg by mouth daily. Yes Historical Provider, MD   simvastatin (ZOCOR) 20 MG tablet Take 20 mg by mouth nightly. Yes Historical Provider, MD   aspirin 81 MG chewable tablet Take 81 mg by mouth daily.      Yes Historical Provider, MD       Current medications:    Current Facility-Administered Medications   Medication Dose Route Frequency Provider Last lb (68.9 kg)    Height: 5' 7\" (1.702 m) 5' 7\" (1.702 m)                                               BP Readings from Last 3 Encounters:   02/11/20 (!) 161/61   01/10/20 (!) 98/48   06/07/19 130/60       NPO Status: Time of last liquid consumption: 1900                        Time of last solid consumption: 1900                        Date of last liquid consumption: 02/10/20                        Date of last solid food consumption: 02/10/20    BMI:   Wt Readings from Last 3 Encounters:   02/11/20 152 lb (68.9 kg)   01/10/20 158 lb (71.7 kg)   06/07/19 159 lb (72.1 kg)     Body mass index is 23.81 kg/m². CBC:   Lab Results   Component Value Date    WBC 6.0 08/31/2016    RBC 3.83 08/31/2016    HGB 11.6 08/31/2016    HCT 34.9 08/31/2016    MCV 91.0 08/31/2016    RDW 14.7 08/31/2016    PLT 96 08/31/2016       CMP:   Lab Results   Component Value Date     08/31/2016    K 4.4 08/31/2016     08/31/2016    CO2 28 08/31/2016    BUN 24 08/31/2016    CREATININE 1.1 08/31/2016    GFRAA >60 08/31/2016    GFRAA >60 01/06/2011    LABGLOM >60 08/31/2016    GLUCOSE 104 08/31/2016    CALCIUM 9.3 08/31/2016       POC Tests: No results for input(s): POCGLU, POCNA, POCK, POCCL, POCBUN, POCHEMO, POCHCT in the last 72 hours.     Coags:   Lab Results   Component Value Date    PROTIME 11.7 01/06/2011    INR 1.07 01/06/2011    APTT 29.2 01/06/2011       HCG (If Applicable): No results found for: PREGTESTUR, PREGSERUM, HCG, HCGQUANT     ABGs: No results found for: PHART, PO2ART, WVV2FGH, GOV9WKI, BEART, L5NRVZVK     Type & Screen (If Applicable):  No results found for: LABABO, [address]    Anesthesia Evaluation  Patient summary reviewed and Nursing notes reviewed  Airway: Mallampati: II        Dental: normal exam         Pulmonary:normal exam                               Cardiovascular:    (+) hypertension:, hyperlipidemia               ROS comment: Summary   -Left ventricular cavity size is normal.   -There is asymmetric

## 2020-02-11 NOTE — ANESTHESIA POSTPROCEDURE EVALUATION
Department of Anesthesiology  Postprocedure Note    Patient: Luca Trevizo  MRN: [id number]  YOB: 1931  Date of evaluation: 2/11/2020  Time:  9:40 AM     Procedure Summary     Date:  02/11/20 Room / Location:  [address]    Anesthesia Start:  0800 Anesthesia Stop:      Procedure:  WIDE EXCISION SQUAMOUS CELL CARCINOMA LEFT CROWN OF HEAD WITH FROZEN SECTION CONTROL OF MARGINS AND IMMEDIATE RECONSTRUCTION WITH GRAFT FROM LEFT NECK (N/A ) Diagnosis:  (SQUAMOUS CELL CARCINOMA C44.42)    Surgeon:  Isabelle Boyce MD Responsible Provider:  Nalini Jeffery MD    Anesthesia Type:  general ASA Status:  3          Anesthesia Type: general    Felix Phase I:      Felix Phase II:      Last vitals: Reviewed and per EMR flowsheets.        Anesthesia Post Evaluation    Patient location during evaluation: PACU  Patient participation: complete - patient participated  Level of consciousness: awake  Airway patency: patent  Nausea & Vomiting: no nausea and no vomiting  Complications: no  Cardiovascular status: blood pressure returned to baseline  Respiratory status: acceptable  Hydration status: euvolemic

## 2020-02-13 NOTE — OP NOTE
a  sterile marking pen. The area was locally infiltrated with 1% Xylocaine  and 1:100,000 epinephrine solution. After a delay of 7 minutes to allow  for the vasoconstrictive effect of the epinephrine, the wide excision  was performed down to the underlying periosteum with a #15 blade. Bovie  electrocauterization was used to achieve hemostasis. The anterior 12  o'clock margin was marked with a 5-0 nylon suture and the specimen was  sent to pathology for frozen section control of margins. All checked  margins were found to be negative. A sterile paper template of the defect was created. The skin and soft tissue defect measured  2.5 x 3.0 cm. The template was transferred to the left supraclavicular  neck. An elliptical excision of full thickness of skin was designed  with a sterile marking pen. The area was subcutaneously infiltrated with 1% Xylocaine  and 1:100,000 epinephrine solution. A #15 blade was used  to perform the full thickness skin excision. The donor site was closed,  after Bovie electrocauterization maintained hemostasis, using interrupted  buried 5-0 Vicryl sutures in the subcutaneous tissues and a running 5-0  nylon suture in the skin. The full thickness skin was defatted and a layer of the deep dermis  was removed as well using curved iris scissors. The skin graft was  inset using skin staples after cutting it to an appropriate size with  the curved iris scissors. 5-0 silk sutures were used to tie a bolster  over the graft that consisted of antibiotic ointment impregnated Adaptic  and gauze fluffs. This firmly held the graft in place in an appropriate  fashion. Prior to the application of bolster, bacitracin solution was  used to irrigate beneath the graft and several small pie-crust slits  were made in the graft using the tenotomy scissors. The left neck was dressed with antibiotic ointment, Adaptic, gauze, and  Microfoam tape.   Gauze fluffs were placed on the top of the head  overlying the bolster and the entire head was encircled with  4-inch Kerlix. The patient tolerated the procedure without difficulty or complication. Sharp, sponge, and instrument counts were correct at the end of the  operative procedure. Estimated blood loss was less than 50 mL. The  patient was taken to the recovery room in good condition with stable  vital signs postoperatively.         Joon Reyes MD, FACS    D: 02/12/2020 16:12:12       T: 02/13/2020 0:46:43     [email or web address]  Job#: [id number]     Doc#: [id number]    CC:

## 2020-04-21 NOTE — TELEPHONE ENCOUNTER
Prescription refill     Last OV: 01/10/2020    Last Refill:   10/02/2019    Labs: 12/16/2019, [facility] Appt:

## 2020-04-22 RX ORDER — HYDRALAZINE HYDROCHLORIDE 50 MG/1
TABLET, FILM COATED ORAL
Qty: 270 TABLET | Refills: 2 | Status: SHIPPED | OUTPATIENT
Start: 2020-04-22 | End: 2020-12-11

## 2020-06-26 RX ORDER — CLONIDINE HYDROCHLORIDE 0.1 MG/1
0.1 TABLET ORAL NIGHTLY
Qty: 90 TABLET | Refills: 3 | Status: SHIPPED | OUTPATIENT
Start: 2020-06-26 | End: 2020-07-20

## 2020-07-20 ENCOUNTER — OFFICE VISIT (OUTPATIENT)
Dept: CARDIOLOGY CLINIC | Age: 85
End: 2020-07-20
Payer: MEDICARE

## 2020-07-20 VITALS
DIASTOLIC BLOOD PRESSURE: 54 MMHG | BODY MASS INDEX: 23.04 KG/M2 | OXYGEN SATURATION: 96 % | HEART RATE: 66 BPM | HEIGHT: 68 IN | SYSTOLIC BLOOD PRESSURE: 134 MMHG | WEIGHT: 152 LBS

## 2020-07-20 PROCEDURE — 4040F PNEUMOC VAC/ADMIN/RCVD: CPT | Performed by: INTERNAL MEDICINE

## 2020-07-20 PROCEDURE — 99214 OFFICE O/P EST MOD 30 MIN: CPT | Performed by: INTERNAL MEDICINE

## 2020-07-20 PROCEDURE — G8427 DOCREV CUR MEDS BY ELIG CLIN: HCPCS | Performed by: INTERNAL MEDICINE

## 2020-07-20 PROCEDURE — 1123F ACP DISCUSS/DSCN MKR DOCD: CPT | Performed by: INTERNAL MEDICINE

## 2020-07-20 PROCEDURE — G8420 CALC BMI NORM PARAMETERS: HCPCS | Performed by: INTERNAL MEDICINE

## 2020-07-20 PROCEDURE — 1036F TOBACCO NON-USER: CPT | Performed by: INTERNAL MEDICINE

## 2020-07-20 RX ORDER — HYDROCHLOROTHIAZIDE 12.5 MG/1
12.5 TABLET ORAL DAILY
Qty: 30 TABLET | Refills: 6
Start: 2020-07-20 | End: 2020-12-11

## 2020-07-20 NOTE — PROGRESS NOTES
hours      terazosin (HYTRIN) 5 MG capsule Take 1 capsule by mouth nightly 30 capsule 6    famotidine (PEPCID) 20 MG tablet Take 1 tablet by mouth daily (Patient taking differently: Take 20 mg by mouth daily TAKES TWICE A DAY) 60 tablet 6    hydrochlorothiazide (HYDRODIURIL) 25 MG tablet Take 1 tablet by mouth daily. 30 tablet 6    allopurinol (ZYLOPRIM) 300 MG tablet Take 300 mg by mouth daily 1/2 tab daily      simvastatin (ZOCOR) 20 MG tablet Take 20 mg by mouth nightly.  aspirin 81 MG chewable tablet Take 81 mg by mouth daily. No current facility-administered medications on file prior to visit. Review of Systems:   · Constitutional: there has been no unanticipated weight loss. No change in energy or activity level   · Eyes: No visual changes   · ENT: No Headaches, hearing loss or vertigo. No mouth sores or sore throat. · Cardiovascular: Reviewed in HPI  · Respiratory: No cough or wheezing, no sputum production. No hematemesis. · Gastrointestinal: No abdominal pain, appetite loss, blood in stools. No change in bowel or bladder habits. · Genitourinary: No nocturia, dysuria, trouble voiding  · Musculoskeletal:  No gait disturbance  · Integumentary: No rash or pruritis. · Neurological: No headache. No change in gait, balance, coordination, mood, affect, memory, mentation, behavior. · Psychiatric: No anxiety or depression  · Endocrine: No malaise or fever  · Hematologic/Lymphatic: No abnormal bruising or bleeding, blood clots or swollen lymph nodes. · Allergic/Immunologic: No nasal congestion or hives. Physical Examination:    Vitals:    07/20/20 1320   BP: (!) 134/54   Site: Left Upper Arm   Position: Sitting   Pulse: 66   SpO2: 96%   Weight: 152 lb (68.9 kg)   Height: 5' 8\" (1.727 m)     Body mass index is 23.11 kg/m².      Wt Readings from Last 3 Encounters:   07/20/20 152 lb (68.9 kg)   02/11/20 152 lb (68.9 kg)   01/10/20 158 lb (71.7 kg)     BP Readings from Last 3 Encounters:

## 2020-07-20 NOTE — LETTER
 hydrALAZINE (APRESOLINE) 50 MG tablet TAKE 1 AND 1/2 TABLETS BY  MOUTH 2 TIMES DAILY 270 tablet 2    losartan (COZAAR) 100 MG tablet TAKE 1 TABLET BY MOUTH  DAILY (Patient taking differently: 50 mg TAKE 1/2 TABLET BY MOUTH  DAILY) 90 tablet 3    carvedilol (COREG) 25 MG tablet TAKE 1 TABLET BY MOUTH TWO  TIMES DAILY 180 tablet 3    brimonidine-timolol (COMBIGAN) 0.2-0.5 % ophthalmic solution Place 1 drop into both eyes every 12 hours      terazosin (HYTRIN) 5 MG capsule Take 1 capsule by mouth nightly 30 capsule 6    famotidine (PEPCID) 20 MG tablet Take 1 tablet by mouth daily (Patient taking differently: Take 20 mg by mouth daily TAKES TWICE A DAY) 60 tablet 6    hydrochlorothiazide (HYDRODIURIL) 25 MG tablet Take 1 tablet by mouth daily. 30 tablet 6    allopurinol (ZYLOPRIM) 300 MG tablet Take 300 mg by mouth daily 1/2 tab daily      simvastatin (ZOCOR) 20 MG tablet Take 20 mg by mouth nightly.  aspirin 81 MG chewable tablet Take 81 mg by mouth daily. No current facility-administered medications on file prior to visit. Review of Systems:   · Constitutional: there has been no unanticipated weight loss. No change in energy or activity level   · Eyes: No visual changes   · ENT: No Headaches, hearing loss or vertigo. No mouth sores or sore throat. · Cardiovascular: Reviewed in HPI  · Respiratory: No cough or wheezing, no sputum production. No hematemesis. · Gastrointestinal: No abdominal pain, appetite loss, blood in stools. No change in bowel or bladder habits. · Genitourinary: No nocturia, dysuria, trouble voiding  · Musculoskeletal:  No gait disturbance  · Integumentary: No rash or pruritis. · Neurological: No headache. No change in gait, balance, coordination, mood, affect, memory, mentation, behavior. · Psychiatric: No anxiety or depression  · Endocrine: No malaise or fever  · Hematologic/Lymphatic: No abnormal bruising or bleeding, blood clots or swollen lymph nodes. sclerotic but opens adequately. Mild aortic regurgitation. Trivial tricuspid regurgitation. Mild pulmonic regurgitation present. Estimated pulmonary artery systolic pressure is normal at 25-30 mmHg assuming a right atrial pressure of 3 mmHg  Echo 2/14> EF 35%. moderate global hypokinesis. reversal of E/A inflow velocities across the mitral valve suggesting impaired left ventricular relaxation. aortic valve is mildly calcified, opens well with normal gradients. Mild-to-moderate aortic regurgitation is present. Pressure half-time is calculated at 770 msec. mild mitral and tricuspid regurgitation with RVSP 30 mmHg. Mild-to-moderate pulmonic regurgitation present. Upper limits of normal to mildly dilated left atrium with a volume of 66ml  Echo 9/08>  EF 35%, LVH, mild to moderate AI   3. Hypertension: lower readings at home   4. Hyperlipemia: Well controlled on Zocor 20mg. Labs 5/21/20: ; TRIG 71; HDL 47; LDL 58   5. Carotid stenosis: Stable. Doppler 6/3/16> 16-49% bilateral      Plan:  Discontinue Clonidine. Cut HCTZ in half (12.5mg) daily. Continue to monitor b/p at home. Continue regular exercise. FU 6 months. Scribe's attestation: This note was scribed in the presence of Dr Jan Jc MD by Kal Hamilton RN. The scribe's documentation has been prepared under my direction and personally reviewed by me in its entirety. I confirm that the note above accurately reflects all work, treatment, procedures, and medical decision making performed by me. Thank you for allowing to me to participate in the care of [address] [place] [address]. If you have questions, please do not hesitate to call me. I look forward to following Rishabh Mariscal along with you.     Sincerely,        Chuck Garcia MD

## 2020-08-21 RX ORDER — CARVEDILOL 25 MG/1
TABLET ORAL
Qty: 180 TABLET | Refills: 3 | Status: SHIPPED | OUTPATIENT
Start: 2020-08-21 | End: 2021-09-02

## 2020-09-24 ENCOUNTER — TELEPHONE (OUTPATIENT)
Dept: CARDIOLOGY CLINIC | Age: 85
End: 2020-09-24

## 2020-12-11 ENCOUNTER — OFFICE VISIT (OUTPATIENT)
Dept: CARDIOLOGY CLINIC | Age: 85
End: 2020-12-11
Payer: MEDICARE

## 2020-12-11 VITALS
HEIGHT: 67 IN | SYSTOLIC BLOOD PRESSURE: 112 MMHG | WEIGHT: 148 LBS | DIASTOLIC BLOOD PRESSURE: 52 MMHG | BODY MASS INDEX: 23.23 KG/M2 | HEART RATE: 66 BPM | OXYGEN SATURATION: 98 %

## 2020-12-11 PROCEDURE — G8484 FLU IMMUNIZE NO ADMIN: HCPCS | Performed by: INTERNAL MEDICINE

## 2020-12-11 PROCEDURE — 99214 OFFICE O/P EST MOD 30 MIN: CPT | Performed by: INTERNAL MEDICINE

## 2020-12-11 PROCEDURE — 1123F ACP DISCUSS/DSCN MKR DOCD: CPT | Performed by: INTERNAL MEDICINE

## 2020-12-11 PROCEDURE — G8427 DOCREV CUR MEDS BY ELIG CLIN: HCPCS | Performed by: INTERNAL MEDICINE

## 2020-12-11 PROCEDURE — 4040F PNEUMOC VAC/ADMIN/RCVD: CPT | Performed by: INTERNAL MEDICINE

## 2020-12-11 PROCEDURE — 1036F TOBACCO NON-USER: CPT | Performed by: INTERNAL MEDICINE

## 2020-12-11 PROCEDURE — G8420 CALC BMI NORM PARAMETERS: HCPCS | Performed by: INTERNAL MEDICINE

## 2020-12-11 RX ORDER — HYDRALAZINE HYDROCHLORIDE 50 MG/1
50 TABLET, FILM COATED ORAL 2 TIMES DAILY
Qty: 270 TABLET | Refills: 2
Start: 2020-12-11 | End: 2020-12-16

## 2020-12-11 NOTE — PROGRESS NOTES
[facility]   Cardiac Evaluation      Patient: Bria Rea  YOB: 1931  Date: 12/11/20     CC; cmp , AR and htn     Referring provider: Jerica Daly    History of Present Illness:   Mr. Gloria Zendejas comes to the office today for follow up. His history includes nonischemic cardiomyopathy, hypertension, and hyperlipidemia. AAA is 3.7cm per US 12/18. Mr. Gloria Zendejas states he feels well generally. He denies exertional chest pain, palpitations, edema. He is very attentive to his wife and takes care of his yard and home. For exercise, he walks. He has dizziness when standing from bending over. Past Medical History:   has a past medical history of AAA (abdominal aortic aneurysm) ([facility] [address] Arthritis, Blind right eye, Cancer ([facility] [address] Cardiomyopathy ([address] Gastric ulcer, Glaucoma, Gout, Hyperlipidemia, Hypertension, and PVD (peripheral vascular disease) ([facility] [address] Surgical History:   has a past surgical history that includes Diagnostic Cardiac Cath Lab Procedure; hernia repair; External ear surgery (Left, 08/31/2016); Knee arthroscopy; Vasectomy; Inguinal hernia repair (Right, 07/23/2018); and pre-malignant / benign skin lesion excision (N/A, 2/11/2020). Social History:   reports that he quit smoking about 63 years ago. He has never used smokeless tobacco. He reports that he does not drink alcohol or use drugs.      Family History:  Son with AR      Current Outpatient Medications on File Prior to Visit   Medication Sig Dispense Refill    carvedilol (COREG) 25 MG tablet TAKE 1 TABLET BY MOUTH TWO  TIMES DAILY 180 tablet 3    hydroCHLOROthiazide (HYDRODIURIL) 12.5 MG tablet Take 1 tablet by mouth daily 30 tablet 6    hydrALAZINE (APRESOLINE) 50 MG tablet TAKE 1 AND 1/2 TABLETS BY  MOUTH 2 TIMES DAILY 270 tablet 2    losartan (COZAAR) 100 MG tablet TAKE 1 TABLET BY MOUTH  DAILY (Patient taking differently: 50 mg TAKE 1/2 TABLET BY MOUTH  DAILY) 90 tablet 3    brimonidine-timolol (COMBIGAN) 0.2-0.5 % ophthalmic solution Place 1 drop into both eyes every 12 hours      terazosin (HYTRIN) 5 MG capsule Take 1 capsule by mouth nightly 30 capsule 6    famotidine (PEPCID) 20 MG tablet Take 1 tablet by mouth daily (Patient taking differently: Take 20 mg by mouth daily TAKES TWICE A DAY) 60 tablet 6    allopurinol (ZYLOPRIM) 300 MG tablet Take 300 mg by mouth daily 1/2 tab daily      simvastatin (ZOCOR) 20 MG tablet Take 20 mg by mouth nightly.  aspirin 81 MG chewable tablet Take 81 mg by mouth daily. No current facility-administered medications on file prior to visit. Review of Systems:   · Constitutional: there has been no unanticipated weight loss. No change in energy or activity level   · Eyes: No visual changes   · ENT: No Headaches, hearing loss or vertigo. No mouth sores or sore throat. · Cardiovascular: Reviewed in HPI  · Respiratory: No cough or wheezing, no sputum production. No hematemesis. · Gastrointestinal: No abdominal pain, appetite loss, blood in stools. No change in bowel or bladder habits. · Genitourinary: No nocturia, dysuria, trouble voiding  · Musculoskeletal:  No gait disturbance  · Integumentary: No rash or pruritis. · Neurological: No headache. No change in gait, balance, coordination, mood, affect, memory, mentation, behavior. · Psychiatric: No anxiety or depression  · Endocrine: No malaise or fever  · Hematologic/Lymphatic: No abnormal bruising or bleeding, blood clots or swollen lymph nodes. · Allergic/Immunologic: No nasal congestion or hives. Physical Examination:    Vitals:    12/11/20 1342 12/11/20 1345   BP: (!) 112/52 (!) 112/52   Site: Left Upper Arm Left Upper Arm   Position: Sitting Sitting   Cuff Size: Medium Adult Medium Adult   Pulse: 66    SpO2: 98%    Weight: 148 lb (67.1 kg)    Height: 5' 7\" (1.702 m)      Body mass index is 23.18 kg/m².      Wt Readings from Last 3 Encounters:   12/11/20 148 lb (67.1 kg)   07/20/20 152 lb (68.9 kg) 02/11/20 152 lb (68.9 kg)     BP Readings from Last 3 Encounters:   12/11/20 (!) 112/52   07/20/20 (!) 134/54   02/11/20 (!) 149/61       Constitutional and General Appearance:  appears stated age  Respiratory:  · Normal excursion and expansion without use of accessory muscles  · Resp Auscultation: Normal breath sounds without dullness  Cardiovascular:  · The apical impulses not displaced  · Heart is regular rate and rhythm with normal S1, positive S4, 2/6 PABLO, reversed split S2  · The carotid upstroke is normal, no bruit noted   · JVP is not elevated  · Peripheral pulses are symmetrical  · There is no clubbing, cyanosis of the extremities  · No edema  · Femoral Arteries: 2+ and equal  · Pedal Pulses: 2+ and equal   Abdomen:  · No masses or tenderness  · Normal bowel sounds  Neurological/Psychiatric:  · Alert and oriented x3  · Moves all extremities well  · Exhibits normal gait balance and coordination    Assessment:  1. Abdominal aneurysm without mention of rupture: no longer seeing Dr Ailin Eddy  CT 5/13> 3.6cm, screening 2/21/17: 3.4cm, 12/18 US AAA 3.7cm   2. Cardiomyopathy: Nonischemic. Cath 2004 min disease   GXT 2007 fixed defects. Stable, no signs of CHF on exam   Echo 6/14/19: asymmetric hypertrophy of the septum near to LV outflow tract. Overall left ventricular systolic function appears borderline with EF 50-55% range. 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 Grade I diastolic dysfunction with normal LV filling pressures. E/e\"=12.5. Trivial mitral regurgitation. Aortic valve appears sclerotic but opens adequately. Mild aortic regurgitation. Trivial tricuspid regurgitation. Mild pulmonic regurgitation present. Estimated pulmonary artery systolic pressure is normal at 25-30 mmHg assuming a right atrial pressure of 3 mmHg  Echo 2/14> EF 35%. moderate global hypokinesis. reversal of E/A inflow velocities across the mitral valve suggesting impaired left ventricular relaxation.  aortic valve is mildly calcified, opens well with normal gradients. Mild-to-moderate aortic regurgitation is present. Pressure half-time is calculated at 770 msec. mild mitral and tricuspid regurgitation with RVSP 30 mmHg. Mild-to-moderate pulmonic regurgitation present. Upper limits of normal to mildly dilated left atrium with a volume of 66ml  Echo 9/08>  EF 35%, LVH, mild to moderate AI   3. Hypertension: lower readings at home   4. Hyperlipemia: Well controlled on Zocor 20mg. Labs 9/23/20: ; TRIG 92; HDL 37; LDL 48   5. Carotid stenosis: Stable. Doppler 6/3/16> 16-49% bilateral      Plan: Will stop HCTZ and decrease Hydralazine to 50mg BID. Continue to monitor b/p at home. FU 6 months. Recheck AAA. Scribe's attestation: This note was scribed in the presence of Dr Kianna Tejada MD by Louisa Ortiz RN. The scribe's documentation has been prepared under my direction and personally reviewed by me in its entirety. I confirm that the note above accurately reflects all work, treatment, procedures, and medical decision making performed by me. Thank you for allowing to me to participate in the care of [address] [place] [address]

## 2020-12-16 ENCOUNTER — TELEPHONE (OUTPATIENT)
Dept: CARDIOLOGY CLINIC | Age: 85
End: 2020-12-16

## 2020-12-16 RX ORDER — HYDRALAZINE HYDROCHLORIDE 50 MG/1
75 TABLET, FILM COATED ORAL 2 TIMES DAILY
Qty: 270 TABLET | Refills: 2
Start: 2020-12-16 | End: 2020-12-21

## 2020-12-16 RX ORDER — HYDROCHLOROTHIAZIDE 12.5 MG/1
12.5 TABLET ORAL DAILY
Qty: 30 TABLET | Refills: 6
Start: 2020-12-16

## 2020-12-16 NOTE — TELEPHONE ENCOUNTER
Dr Christiano Benoit discontinued HCTZ 12.5mg at [address] 12/11/20 due to low blood pressure readings. Since then, his b/p is running in the 150's/70's and he states he has gained 2 lbs. Per Dr Asencio>restart HCTZ 12.5mg daily and continue to monitor b/p and weight. He is also taking Hydralazine 75mg BID, which he will continue.

## 2020-12-17 RX ORDER — LOSARTAN POTASSIUM 100 MG/1
TABLET ORAL
Qty: 90 TABLET | Refills: 3 | Status: SHIPPED | OUTPATIENT
Start: 2020-12-17 | End: 2022-01-10

## 2020-12-18 ENCOUNTER — HOSPITAL ENCOUNTER (OUTPATIENT)
Dept: CARDIOLOGY | Age: 85
Discharge: HOME | End: 2020-12-18
Payer: MEDICARE

## 2020-12-18 PROCEDURE — 93978 VASCULAR STUDY: CPT

## 2020-12-21 RX ORDER — HYDRALAZINE HYDROCHLORIDE 50 MG/1
TABLET, FILM COATED ORAL
Qty: 270 TABLET | Refills: 3 | Status: SHIPPED | OUTPATIENT
Start: 2020-12-21 | End: 2022-01-10

## 2021-02-26 ENCOUNTER — TELEPHONE (OUTPATIENT)
Dept: CARDIOLOGY CLINIC | Age: 86
End: 2021-02-26

## 2021-02-26 NOTE — TELEPHONE ENCOUNTER
Pt calling he and his wife got their second covid vaccine feb 13th  and they both feel like they have no energy and asking if they could take an energy drink or vitamin b-12 pls call to advise thank you

## 2021-06-09 ENCOUNTER — OFFICE VISIT (OUTPATIENT)
Dept: CARDIOLOGY CLINIC | Age: 86
End: 2021-06-09
Payer: MEDICARE

## 2021-06-09 VITALS
BODY MASS INDEX: 23.23 KG/M2 | DIASTOLIC BLOOD PRESSURE: 60 MMHG | SYSTOLIC BLOOD PRESSURE: 116 MMHG | WEIGHT: 148 LBS | HEART RATE: 60 BPM | OXYGEN SATURATION: 98 % | HEIGHT: 67 IN

## 2021-06-09 DIAGNOSIS — E78.2 MIXED HYPERLIPIDEMIA: ICD-10-CM

## 2021-06-09 DIAGNOSIS — I42.9 CARDIOMYOPATHY, UNSPECIFIED TYPE (HCC): ICD-10-CM

## 2021-06-09 DIAGNOSIS — I65.23 BILATERAL CAROTID ARTERY STENOSIS: ICD-10-CM

## 2021-06-09 DIAGNOSIS — I10 ESSENTIAL HYPERTENSION: Primary | ICD-10-CM

## 2021-06-09 PROCEDURE — 4040F PNEUMOC VAC/ADMIN/RCVD: CPT | Performed by: INTERNAL MEDICINE

## 2021-06-09 PROCEDURE — 1123F ACP DISCUSS/DSCN MKR DOCD: CPT | Performed by: INTERNAL MEDICINE

## 2021-06-09 PROCEDURE — 1036F TOBACCO NON-USER: CPT | Performed by: INTERNAL MEDICINE

## 2021-06-09 PROCEDURE — 99214 OFFICE O/P EST MOD 30 MIN: CPT | Performed by: INTERNAL MEDICINE

## 2021-06-09 PROCEDURE — G8427 DOCREV CUR MEDS BY ELIG CLIN: HCPCS | Performed by: INTERNAL MEDICINE

## 2021-06-09 PROCEDURE — G8420 CALC BMI NORM PARAMETERS: HCPCS | Performed by: INTERNAL MEDICINE

## 2021-06-09 NOTE — PROGRESS NOTES
[facility]   Cardiac Evaluation      Patient: Annie Hutchinson  YOB: 1931  Date: 6/9/21     CC; cmp , AR and htn     Referring provider: Colleen Hughes    History of Present Illness:   Mr. Emily Garcia comes to the office today for follow up. His history includes nonischemic cardiomyopathy, hypertension, and hyperlipidemia. AAA is 3.7cm per US 12/18. Mr. Emily Garcia states he has been ok. He is here with his wife and son. Iliana Sandoval denies any chest pain, palpitations, BALL, dizziness, or edema. He continues to monitor his b/p and HR daily and records readings. Past Medical History:   has a past medical history of AAA (abdominal aortic aneurysm) ([facility] [address] Arthritis, Blind right eye, Cancer ([facility] [address] Cardiomyopathy ([facility] [address] Gastric ulcer, Glaucoma, Gout, Hyperlipidemia, Hypertension, and PVD (peripheral vascular disease) ([facility] [address] Surgical History:   has a past surgical history that includes Diagnostic Cardiac Cath Lab Procedure; hernia repair; External ear surgery (Left, 08/31/2016); Knee arthroscopy; Vasectomy; Inguinal hernia repair (Right, 07/23/2018); and pre-malignant / benign skin lesion excision (N/A, 2/11/2020). Social History:   reports that he quit smoking about 64 years ago. He has never used smokeless tobacco. He reports that he does not drink alcohol and does not use drugs.      Family History:  Son with AR      Current Outpatient Medications on File Prior to Visit   Medication Sig Dispense Refill    hydrALAZINE (APRESOLINE) 50 MG tablet TAKE 1 AND 1/2 TABLETS BY  MOUTH 2 TIMES DAILY 270 tablet 3    losartan (COZAAR) 100 MG tablet TAKE 1 TABLET BY MOUTH  DAILY 90 tablet 3    hydroCHLOROthiazide (HYDRODIURIL) 12.5 MG tablet Take 1 tablet by mouth daily 30 tablet 6    carvedilol (COREG) 25 MG tablet TAKE 1 TABLET BY MOUTH TWO  TIMES DAILY 180 tablet 3    brimonidine-timolol (COMBIGAN) 0.2-0.5 % ophthalmic solution Place 1 drop into both eyes every 12 hours      terazosin (HYTRIN) 5 MG capsule Take 1 capsule by mouth nightly 30 capsule 6    famotidine (PEPCID) 20 MG tablet Take 1 tablet by mouth daily (Patient taking differently: Take 20 mg by mouth daily TAKES TWICE A DAY) 60 tablet 6    allopurinol (ZYLOPRIM) 300 MG tablet Take 300 mg by mouth daily 1/2 tab daily      simvastatin (ZOCOR) 20 MG tablet Take 20 mg by mouth nightly.  aspirin 81 MG chewable tablet Take 81 mg by mouth daily. No current facility-administered medications on file prior to visit. Review of Systems:   · Constitutional: there has been no unanticipated weight loss. No change in energy or activity level   · Eyes: No visual changes   · ENT: No Headaches, hearing loss or vertigo. No mouth sores or sore throat. · Cardiovascular: Reviewed in HPI  · Respiratory: No cough or wheezing, no sputum production. No hematemesis. · Gastrointestinal: No abdominal pain, appetite loss, blood in stools. No change in bowel or bladder habits. · Genitourinary: No nocturia, dysuria, trouble voiding  · Musculoskeletal:  No gait disturbance  · Integumentary: No rash or pruritis. · Neurological: No headache. No change in gait, balance, coordination, mood, affect, memory, mentation, behavior. · Psychiatric: No anxiety or depression  · Endocrine: No malaise or fever  · Hematologic/Lymphatic: No abnormal bruising or bleeding, blood clots or swollen lymph nodes. · Allergic/Immunologic: No nasal congestion or hives. Physical Examination:    Vitals:    06/09/21 1430   BP: 116/60   Site: Left Upper Arm   Position: Sitting   Cuff Size: Medium Adult   Pulse: 60   SpO2: 98%   Weight: 148 lb (67.1 kg)   Height: 5' 7\" (1.702 m)     Body mass index is 23.18 kg/m².      Wt Readings from Last 3 Encounters:   06/09/21 148 lb (67.1 kg)   12/11/20 148 lb (67.1 kg)   07/20/20 152 lb (68.9 kg)     BP Readings from Last 3 Encounters:   06/09/21 116/60   12/11/20 (!) 112/52   07/20/20 (!) 134/54       Constitutional and General Appearance: appears stated age  Respiratory:  · Normal excursion and expansion without use of accessory muscles  · Resp Auscultation: Normal breath sounds without dullness  Cardiovascular:  · The apical impulses not displaced  · Heart is regular rate and rhythm with normal S1, positive S4, 1/6 PABLO, reversed split S2  · The carotid upstroke is normal, bruit noted   · JVP is not elevated  · Peripheral pulses are symmetrical  · There is no clubbing, cyanosis of the extremities  · No edema  · Femoral Arteries: 2+ and equal  · Pedal Pulses: 2+ and equal   Abdomen:  · No masses or tenderness  · Normal bowel sounds  Neurological/Psychiatric:  · Alert and oriented x3  · Moves all extremities well  · Exhibits normal gait balance and coordination    Assessment:  1. Abdominal aneurysm without mention of rupture: no longer seeing Dr Gerry Land 5/13> 3.6cm, US 2/17: 3.4cm, 12/18 US AAA 3.7cm, US AAA 12/18/20: 4.0cm   2. Cardiomyopathy: Nonischemic. Cath 2004 min disease   GXT 2007 fixed defects. Stable, no signs of CHF on exam   Echo 6/14/19: asymmetric hypertrophy of the septum near to LV outflow tract. Overall left ventricular systolic function appears borderline with EF 50-55% range. 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 Grade I diastolic dysfunction with normal LV filling pressures. E/e\"=12.5. Trivial mitral regurgitation. Aortic valve appears sclerotic but opens adequately. Mild aortic regurgitation. Trivial tricuspid regurgitation. Mild pulmonic regurgitation present. Estimated pulmonary artery systolic pressure is normal at 25-30 mmHg assuming a right atrial pressure of 3 mmHg  Echo 2/14> EF 35%. moderate global hypokinesis. reversal of E/A inflow velocities across the mitral valve suggesting impaired left ventricular relaxation. aortic valve is mildly calcified, opens well with normal gradients. Mild-to-moderate aortic regurgitation is present.  Pressure half-time is calculated at 770 msec. mild mitral

## 2021-06-18 ENCOUNTER — HOSPITAL ENCOUNTER (OUTPATIENT)
Dept: CARDIOLOGY | Age: 86
Discharge: HOME | End: 2021-06-18
Payer: MEDICARE

## 2021-06-18 DIAGNOSIS — I65.23 BILATERAL CAROTID ARTERY STENOSIS: ICD-10-CM

## 2021-06-18 PROCEDURE — 93880 EXTRACRANIAL BILAT STUDY: CPT

## 2021-09-02 RX ORDER — CARVEDILOL 25 MG/1
TABLET ORAL
Qty: 180 TABLET | Refills: 3 | Status: SHIPPED | OUTPATIENT
Start: 2021-09-02 | End: 2022-06-03

## 2021-09-02 RX ORDER — CLONIDINE HYDROCHLORIDE 0.1 MG/1
TABLET ORAL
Qty: 90 TABLET | Refills: 3 | OUTPATIENT
Start: 2021-09-02

## 2021-10-06 RX ORDER — CLONIDINE HYDROCHLORIDE 0.1 MG/1
TABLET ORAL
Qty: 90 TABLET | Refills: 3 | OUTPATIENT
Start: 2021-10-06

## 2021-10-06 NOTE — TELEPHONE ENCOUNTER
The original prescription was discontinued on 7/20/2020 by Evie Mc RN. Renewing this prescription may not be appropriate.

## 2021-11-08 ENCOUNTER — OFFICE VISIT (OUTPATIENT)
Dept: SURGERY | Age: 86
End: 2021-11-08
Payer: MEDICARE

## 2021-11-08 VITALS — BODY MASS INDEX: 23.18 KG/M2 | SYSTOLIC BLOOD PRESSURE: 142 MMHG | DIASTOLIC BLOOD PRESSURE: 63 MMHG | WEIGHT: 148 LBS

## 2021-11-08 DIAGNOSIS — S41.001A WOUND OF RIGHT SHOULDER, INITIAL ENCOUNTER: Primary | ICD-10-CM

## 2021-11-08 PROCEDURE — G8427 DOCREV CUR MEDS BY ELIG CLIN: HCPCS | Performed by: SURGERY

## 2021-11-08 PROCEDURE — 4040F PNEUMOC VAC/ADMIN/RCVD: CPT | Performed by: SURGERY

## 2021-11-08 PROCEDURE — 1036F TOBACCO NON-USER: CPT | Performed by: SURGERY

## 2021-11-08 PROCEDURE — G8484 FLU IMMUNIZE NO ADMIN: HCPCS | Performed by: SURGERY

## 2021-11-08 PROCEDURE — G8420 CALC BMI NORM PARAMETERS: HCPCS | Performed by: SURGERY

## 2021-11-08 PROCEDURE — 1123F ACP DISCUSS/DSCN MKR DOCD: CPT | Performed by: SURGERY

## 2021-11-08 PROCEDURE — 99212 OFFICE O/P EST SF 10 MIN: CPT | Performed by: SURGERY

## 2021-11-08 ASSESSMENT — ENCOUNTER SYMPTOMS
RESPIRATORY NEGATIVE: 1
GASTROINTESTINAL NEGATIVE: 1
ALLERGIC/IMMUNOLOGIC NEGATIVE: 1
EYES NEGATIVE: 1
COLOR CHANGE: 1

## 2021-11-08 NOTE — PROGRESS NOTES
Gerson Ballard is a 80 y.o. male     CC: Wound to the right deltoid region    HPI: 70-year-old male with a wound of the right deltoid region      Past Medical History:   Diagnosis Date    AAA (abdominal aortic aneurysm) (HCC)     3.7cm, follows with Dr Milena Drew right eye     Cancer ([facility] [address]     ear    Cardiomyopathy ([facility] [address]     Gastric ulcer     Glaucoma     Gout     Hyperlipidemia     Hypertension     PVD (peripheral vascular disease) ([facility] [address]        Penicillins, Indapamide, Naproxen, and Omeprazole magnesium     Past Surgical History:   Procedure Laterality Date    DIAGNOSTIC CARDIAC CATH LAB PROCEDURE      EXTERNAL EAR SURGERY Left 08/31/2016    ACTUAL PROCEDURE: LEFT EAR RECONSTRUCTION     HERNIA REPAIR      INGUINAL HERNIA REPAIR Right 07/23/2018    KNEE ARTHROSCOPY      PRE-MALIGNANT / BENIGN SKIN LESION EXCISION N/A 2/11/2020    WIDE EXCISION 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  Authorizing Provider   carvedilol (COREG) 25 MG tablet TAKE 1 TABLET BY MOUTH  TWICE DAILY Yes Mike Enriquez MD   hydrALAZINE (APRESOLINE) 50 MG tablet TAKE 1 AND 1/2 TABLETS BY  MOUTH 2 TIMES DAILY Yes Mike Enriquez MD   losartan (COZAAR) 100 MG tablet TAKE 1 TABLET BY MOUTH  DAILY Yes Mike Enriquez MD   hydroCHLOROthiazide (HYDRODIURIL) 12.5 MG tablet Take 1 tablet by mouth daily Yes Mike Enriquez MD   brimonidine-timolol (COMBIGAN) 0.2-0.5 % ophthalmic solution Place 1 drop into both eyes every 12 hours Yes Historical Provider, MD   terazosin (HYTRIN) 5 MG capsule Take 1 capsule by mouth nightly Yes Mike Enriquez MD   famotidine (PEPCID) 20 MG tablet Take 1 tablet by mouth daily  Patient taking differently: Take 20 mg by mouth daily TAKES TWICE A DAY Yes Ivor Merlin, MD   allopurinol (ZYLOPRIM) 300 MG tablet Take 300 mg by mouth daily 1/2 tab daily Yes Historical Provider, MD   simvastatin (ZOCOR) 20 MG tablet Take 20 mg by mouth nightly. Yes Historical Provider, MD   aspirin 81 MG chewable tablet Take 81 mg by mouth daily. Yes Historical Provider, MD       Social History     Socioeconomic History    Marital status:      Spouse name: Not on file    Number of children: Not on file    Years of education: Not on file    Highest education level: Not on file   Occupational History    Not on file   Tobacco Use    Smoking status: Former Smoker     Quit date: 1957     Years since quittin.8    Smokeless tobacco: Never Used    Tobacco comment: only smoked in the service   Vaping Use    Vaping Use: Never used   Substance and Sexual Activity    Alcohol use: No    Drug use: No    Sexual activity: Not on file   Other Topics Concern    Not on file   Social History Narrative    Not on file     Social Determinants of Health     Financial Resource Strain:     Difficulty of Paying Living Expenses: Not on file   Food Insecurity:     Worried About [address] in the Last Year: Not on file    [address] in the Last Year: Not on file   Transportation Needs:     Lack of Transportation (Medical): Not on file    Lack of Transportation (Non-Medical):  Not on file   Physical Activity:     Days of Exercise per Week: Not on file    Minutes of Exercise per Session: Not on file   Stress:     Feeling of Stress : Not on file   Social Connections:     Frequency of Communication with Friends and Family: Not on file    Frequency of Social Gatherings with Friends and Family: Not on file    Attends Voodoo Services: Not on file    Active Member of Clubs or Organizations: Not on file    Attends Club or Organization Meetings: Not on file    Marital Status: Not on file   Intimate Partner Violence:     Fear of Current or Ex-Partner: Not on file    Emotionally Abused: Not on file    Physically Abused: Not on file    Sexually Abused: Not on file   Housing Stability:     Unable to Pay for Housing in the Last Year: Not on file    Number of Places Lived in the Last Year: Not on file    Unstable Housing in the Last Year: Not on file       Review of Systems   Constitutional: Negative. HENT: Negative. Eyes: Negative. Respiratory: Negative. Cardiovascular: Negative. Gastrointestinal: Negative. Endocrine: Negative. Genitourinary: Negative. Musculoskeletal: Negative. Skin: Positive for color change and wound. Allergic/Immunologic: Negative. Neurological: Negative. Hematological: Negative. Psychiatric/Behavioral: Negative.        :   Physical Exam  Constitutional:       Appearance: He is well-developed. HENT:      Head: Normocephalic and atraumatic. Right Ear: External ear normal.      Left Ear: External ear normal.   Eyes:      Conjunctiva/sclera: Conjunctivae normal.   Musculoskeletal:         General: Normal range of motion. Cervical back: Normal range of motion and neck supple. Skin:     General: Skin is warm and dry. Neurological:      Mental Status: He is alert and oriented to person, place, and time. Psychiatric:         Behavior: Behavior normal.       Wt 148 lb (67.1 kg)   BMI 23.18 kg/m²   Wound of the right deltoid region  :      80-year-old male with a persistent wound to the right deltoid region where he had his Covid shot back in January of this year. Plan:      Excision of chronic wound of the right shoulder region under local anesthetic in the office.

## 2021-11-15 ENCOUNTER — PROCEDURE VISIT (OUTPATIENT)
Dept: SURGERY | Age: 86
End: 2021-11-15
Payer: MEDICARE

## 2021-11-15 VITALS — WEIGHT: 148 LBS | SYSTOLIC BLOOD PRESSURE: 143 MMHG | DIASTOLIC BLOOD PRESSURE: 63 MMHG | BODY MASS INDEX: 23.18 KG/M2

## 2021-11-15 DIAGNOSIS — D22.9 NEVUS: Primary | ICD-10-CM

## 2021-11-15 PROCEDURE — 11602 EXC TR-EXT MAL+MARG 1.1-2 CM: CPT | Performed by: SURGERY

## 2021-11-15 NOTE — PROGRESS NOTES
Office Procedure    Pre op Dx-chronic wound/lesion of the right upper arm    Post op Dx-same    Procedure-Excision of 12 mm lesion of the right upper arm    Surgeon-Dr. Missy Winston    Anesthesia-local    EBL-min    Operative indications and consent-80year-old male with a persistent wound/skin lesion in the right deltoid region where he received a COVID-19 vaccination 9 months ago. The plan is for excision of the area with primary closure. Patient explained risks,benefits, complications and alternatives. Procedure-Patient prepped and draped in the usual sterile fashion. Supine position. The skin and subcutaneous tissues were infiltrated with 1% lidocaine. A 12 mm lesion of the right deltoid area was excised. The skin was closed with 4-0 nylon simple sutures. A dressing was then applied. The patient tolerated the procedure well.

## 2021-11-24 ENCOUNTER — OFFICE VISIT (OUTPATIENT)
Dept: SURGERY | Age: 86
End: 2021-11-24

## 2021-11-24 VITALS — BODY MASS INDEX: 23.18 KG/M2 | WEIGHT: 148 LBS

## 2021-11-24 DIAGNOSIS — Z09 POSTOP CHECK: Primary | ICD-10-CM

## 2021-11-24 PROCEDURE — 99024 POSTOP FOLLOW-UP VISIT: CPT | Performed by: SURGERY

## 2021-11-24 NOTE — PROGRESS NOTES
Status post excision of a skin lesion at the site of a previous COVID-19 vaccination. Pathology showed basal cell cancer which has been completely excised. Doing well postoperatively. The sutures were removed. Follow-up as needed.

## 2021-12-14 ENCOUNTER — OFFICE VISIT (OUTPATIENT)
Dept: CARDIOLOGY CLINIC | Age: 86
End: 2021-12-14
Payer: MEDICARE

## 2021-12-14 VITALS
HEART RATE: 64 BPM | HEIGHT: 67 IN | BODY MASS INDEX: 23.23 KG/M2 | OXYGEN SATURATION: 99 % | WEIGHT: 148 LBS | DIASTOLIC BLOOD PRESSURE: 60 MMHG | SYSTOLIC BLOOD PRESSURE: 128 MMHG

## 2021-12-14 DIAGNOSIS — E78.2 MIXED HYPERLIPIDEMIA: Primary | ICD-10-CM

## 2021-12-14 DIAGNOSIS — I10 PRIMARY HYPERTENSION: ICD-10-CM

## 2021-12-14 DIAGNOSIS — I42.9 CARDIOMYOPATHY, UNSPECIFIED TYPE (HCC): ICD-10-CM

## 2021-12-14 DIAGNOSIS — I65.23 BILATERAL CAROTID ARTERY STENOSIS: ICD-10-CM

## 2021-12-14 PROCEDURE — 4040F PNEUMOC VAC/ADMIN/RCVD: CPT | Performed by: INTERNAL MEDICINE

## 2021-12-14 PROCEDURE — 99214 OFFICE O/P EST MOD 30 MIN: CPT | Performed by: INTERNAL MEDICINE

## 2021-12-14 PROCEDURE — G8420 CALC BMI NORM PARAMETERS: HCPCS | Performed by: INTERNAL MEDICINE

## 2021-12-14 PROCEDURE — G8427 DOCREV CUR MEDS BY ELIG CLIN: HCPCS | Performed by: INTERNAL MEDICINE

## 2021-12-14 PROCEDURE — 1123F ACP DISCUSS/DSCN MKR DOCD: CPT | Performed by: INTERNAL MEDICINE

## 2021-12-14 PROCEDURE — 1036F TOBACCO NON-USER: CPT | Performed by: INTERNAL MEDICINE

## 2021-12-14 PROCEDURE — G8484 FLU IMMUNIZE NO ADMIN: HCPCS | Performed by: INTERNAL MEDICINE

## 2021-12-14 RX ORDER — CLONIDINE HYDROCHLORIDE 0.1 MG/1
TABLET ORAL
COMMUNITY
Start: 2021-10-19 | End: 2022-06-21

## 2021-12-14 NOTE — PROGRESS NOTES
[address]   Cardiac Evaluation      Patient: Devi Goodwin  YOB: 1931  Date: 12/14/21     CC; cmp , AR and htn     Referring provider: Elizabeth Glover    History of Present Illness:   Mr. Mani Callejas comes to the office today for follow up. His history includes nonischemic cardiomyopathy, hypertension, and hyperlipidemia. AAA 4.0cm on screening 12/18/20. Mr. Mani Callejas is here with his wife and daughter in law. He denies chest pain, palpitations, BALL, dizziness, or edema. His home bp's are controlled and HR's 50-60. Past Medical History:   has a past medical history of AAA (abdominal aortic aneurysm) ([facility] [address] Arthritis, Blind right eye, Cancer ([facility] [address] Cardiomyopathy ([facility] [address] Gastric ulcer, Glaucoma, Gout, Hyperlipidemia, Hypertension, and PVD (peripheral vascular disease) ([facility] [address] Surgical History:   has a past surgical history that includes Diagnostic Cardiac Cath Lab Procedure; hernia repair; External ear surgery (Left, 08/31/2016); Knee arthroscopy; Vasectomy; Inguinal hernia repair (Right, 07/23/2018); and pre-malignant / benign skin lesion excision (N/A, 2/11/2020). Social History:   reports that he quit smoking about 64 years ago. He has never used smokeless tobacco. He reports that he does not drink alcohol and does not use drugs.      Family History:  Son with AR      Current Outpatient Medications on File Prior to Visit   Medication Sig Dispense Refill    cloNIDine (CATAPRES) 0.1 MG tablet Take half tablet po qd      carvedilol (COREG) 25 MG tablet TAKE 1 TABLET BY MOUTH  TWICE DAILY 180 tablet 3    hydrALAZINE (APRESOLINE) 50 MG tablet TAKE 1 AND 1/2 TABLETS BY  MOUTH 2 TIMES DAILY 270 tablet 3    losartan (COZAAR) 100 MG tablet TAKE 1 TABLET BY MOUTH  DAILY 90 tablet 3    hydroCHLOROthiazide (HYDRODIURIL) 12.5 MG tablet Take 1 tablet by mouth daily 30 tablet 6    brimonidine-timolol (COMBIGAN) 0.2-0.5 % ophthalmic solution Place 1 drop into both eyes every 12 hours  terazosin (HYTRIN) 5 MG capsule Take 1 capsule by mouth nightly 30 capsule 6    famotidine (PEPCID) 20 MG tablet Take 1 tablet by mouth daily (Patient taking differently: Take 20 mg by mouth daily TAKES TWICE A DAY) 60 tablet 6    allopurinol (ZYLOPRIM) 300 MG tablet Take 300 mg by mouth daily 1/2 tab daily      simvastatin (ZOCOR) 20 MG tablet Take 20 mg by mouth nightly.  aspirin 81 MG chewable tablet Take 81 mg by mouth daily. No current facility-administered medications on file prior to visit. Review of Systems:   · Constitutional: there has been no unanticipated weight loss. No change in energy or activity level   · Eyes: No visual changes   · ENT: No Headaches, hearing loss or vertigo. No mouth sores or sore throat. · Cardiovascular: Reviewed in HPI  · Respiratory: No cough or wheezing, no sputum production. No hematemesis. · Gastrointestinal: No abdominal pain, appetite loss, blood in stools. No change in bowel or bladder habits. · Genitourinary: No nocturia, dysuria, trouble voiding  · Musculoskeletal:  No gait disturbance  · Integumentary: No rash or pruritis. · Neurological: No headache. No change in gait, balance, coordination, mood, affect, memory, mentation, behavior. · Psychiatric: No anxiety or depression  · Endocrine: No malaise or fever  · Hematologic/Lymphatic: No abnormal bruising or bleeding, blood clots or swollen lymph nodes. · Allergic/Immunologic: No nasal congestion or hives. Physical Examination:    Vitals:    12/14/21 1536   BP: 128/60   Site: Left Upper Arm   Position: Sitting   Cuff Size: Medium Adult   Pulse: 64   SpO2: 99%   Weight: 148 lb (67.1 kg)   Height: 5' 7\" (1.702 m)     Body mass index is 23.18 kg/m².      Wt Readings from Last 3 Encounters:   12/14/21 148 lb (67.1 kg)   11/24/21 148 lb (67.1 kg)   11/15/21 148 lb (67.1 kg)     BP Readings from Last 3 Encounters:   12/14/21 128/60   11/15/21 (!) 143/63   11/08/21 (!) 142/63 Constitutional and General Appearance:  appears stated age  Respiratory:  · Normal excursion and expansion without use of accessory muscles  · Resp Auscultation: Normal breath sounds without dullness  Cardiovascular:  · The apical impulses not displaced  · Heart is regular rate and rhythm with normal S1, positive S4, 1/6 PABLO, reversed split S2  · The carotid upstroke is normal, right bruit noted   · JVP is not elevated  · Peripheral pulses are symmetrical  · There is no clubbing, cyanosis of the extremities  · No edema  · Femoral Arteries: 2+ and equal  · Pedal Pulses: 2+ and equal   Abdomen:  · No masses or tenderness  · Normal bowel sounds  Neurological/Psychiatric:  · Alert and oriented x3  · Moves all extremities well  · Exhibits normal gait balance and coordination    Assessment:  1. Abdominal aneurysm without mention of rupture: no longer seeing Dr Alireza Shabazz 5/13> 3.6cm, US 2/17: 3.4cm, 12/18 US AAA 3.7cm, US AAA 12/18/20: 4.0cm   2. Cardiomyopathy: Nonischemic. Cath 2004 min disease   GXT 2007 fixed defects. Stable, no signs of CHF on exam   Echo 6/14/19: asymmetric hypertrophy of the septum near to LV outflow tract. Overall left ventricular systolic function appears borderline with EF 50-55% range. 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 Grade I diastolic dysfunction with normal LV filling pressures. E/e\"=12.5. Trivial mitral regurgitation. Aortic valve appears sclerotic but opens adequately. Mild aortic regurgitation. Trivial tricuspid regurgitation. Mild pulmonic regurgitation present. Estimated pulmonary artery systolic pressure is normal at 25-30 mmHg assuming a right atrial pressure of 3 mmHg  Echo 2/14> EF 35%. moderate global hypokinesis. reversal of E/A inflow velocities across the mitral valve suggesting impaired left ventricular relaxation. aortic valve is mildly calcified, opens well with normal gradients. Mild-to-moderate aortic regurgitation is present.  Pressure half-time is calculated at 770 msec. mild mitral and tricuspid regurgitation with RVSP 30 mmHg. Mild-to-moderate pulmonic regurgitation present. Upper limits of normal to mildly dilated left atrium with a volume of 66ml  Echo 9/08>  EF 35%, LVH, mild to moderate AI   3. Hypertension: stable    4. Hyperlipemia: Well controlled on Zocor 20mg. Labs 10/14/21: ; TRIG 95; HDL 41; LDL 48   5. Carotid stenosis: Stable, repeat in June  Doppler 6/18/21> <50% bilateral  Doppler 6/3/16> 16-49% bilateral      Plan:    Gautam appears stable. No med changes. Encouraged to stay active. FU 4 months. Scribe's attestation: This note was scribed in the presence of Dr Giancarlo Spain MD by Phil Halsted, RN. The scribe's documentation has been prepared under my direction and personally reviewed by me in its entirety. I confirm that the note above accurately reflects all work, treatment, procedures, and medical decision making performed by me. Thank you for allowing to me to participate in the care of [address] [place] [address]

## 2022-01-10 RX ORDER — HYDRALAZINE HYDROCHLORIDE 50 MG/1
TABLET, FILM COATED ORAL
Qty: 270 TABLET | Refills: 3 | Status: SHIPPED | OUTPATIENT
Start: 2022-01-10 | End: 2022-04-25

## 2022-01-10 RX ORDER — LOSARTAN POTASSIUM 100 MG/1
TABLET ORAL
Qty: 90 TABLET | Refills: 3 | Status: SHIPPED | OUTPATIENT
Start: 2022-01-10

## 2022-04-11 ENCOUNTER — TELEPHONE (OUTPATIENT)
Dept: CARDIOLOGY CLINIC | Age: 87
End: 2022-04-11

## 2022-04-25 ENCOUNTER — OFFICE VISIT (OUTPATIENT)
Dept: CARDIOLOGY CLINIC | Age: 87
End: 2022-04-25
Payer: MEDICARE

## 2022-04-25 VITALS
WEIGHT: 146 LBS | SYSTOLIC BLOOD PRESSURE: 116 MMHG | HEIGHT: 67 IN | DIASTOLIC BLOOD PRESSURE: 50 MMHG | BODY MASS INDEX: 22.91 KG/M2 | HEART RATE: 60 BPM | OXYGEN SATURATION: 99 %

## 2022-04-25 DIAGNOSIS — I65.23 BILATERAL CAROTID ARTERY STENOSIS: ICD-10-CM

## 2022-04-25 DIAGNOSIS — R06.02 SHORTNESS OF BREATH: ICD-10-CM

## 2022-04-25 DIAGNOSIS — R42 DIZZINESS: ICD-10-CM

## 2022-04-25 DIAGNOSIS — I42.9 CARDIOMYOPATHY, UNSPECIFIED TYPE (HCC): ICD-10-CM

## 2022-04-25 DIAGNOSIS — E78.2 MIXED HYPERLIPIDEMIA: ICD-10-CM

## 2022-04-25 DIAGNOSIS — I10 PRIMARY HYPERTENSION: Primary | ICD-10-CM

## 2022-04-25 DIAGNOSIS — I71.40 ABDOMINAL AORTIC ANEURYSM (AAA) WITHOUT RUPTURE: ICD-10-CM

## 2022-04-25 PROCEDURE — 4040F PNEUMOC VAC/ADMIN/RCVD: CPT | Performed by: INTERNAL MEDICINE

## 2022-04-25 PROCEDURE — G8420 CALC BMI NORM PARAMETERS: HCPCS | Performed by: INTERNAL MEDICINE

## 2022-04-25 PROCEDURE — 1123F ACP DISCUSS/DSCN MKR DOCD: CPT | Performed by: INTERNAL MEDICINE

## 2022-04-25 PROCEDURE — G8428 CUR MEDS NOT DOCUMENT: HCPCS | Performed by: INTERNAL MEDICINE

## 2022-04-25 PROCEDURE — 1036F TOBACCO NON-USER: CPT | Performed by: INTERNAL MEDICINE

## 2022-04-25 PROCEDURE — 99214 OFFICE O/P EST MOD 30 MIN: CPT | Performed by: INTERNAL MEDICINE

## 2022-04-25 RX ORDER — HYDRALAZINE HYDROCHLORIDE 50 MG/1
50 TABLET, FILM COATED ORAL 2 TIMES DAILY
Qty: 270 TABLET | Refills: 3
Start: 2022-04-25

## 2022-04-25 NOTE — PROGRESS NOTES
[facility]   Cardiac Evaluation      Patient: Vivienne Moreno  YOB: 1931  Date: 4/25/22     CC; cmp , AR and htn     Referring provider: Aracely Dennis    History of Present Illness:   Mr. Flo Carvalho comes to the office today for follow up. His history includes nonischemic cardiomyopathy, hypertension, and hyperlipidemia. AAA 4.0cm on screening 12/18/20. Mr. Flo Carvalho is here with his wife and son. He states he is dizzy when he bends over and stands back up. Alberto Olmos denies chest pain, palpitations, or edema. He has shortness of breath with exertion. Past Medical History:   has a past medical history of AAA (abdominal aortic aneurysm) ([facility] [address] Arthritis, Blind right eye, Cancer ([facility] [address] Cardiomyopathy ([address] Gastric ulcer, Glaucoma, Gout, Hyperlipidemia, Hypertension, and PVD (peripheral vascular disease) ([facility] [address] Surgical History:   has a past surgical history that includes Diagnostic Cardiac Cath Lab Procedure; hernia repair; External ear surgery (Left, 08/31/2016); Knee arthroscopy; Vasectomy; Inguinal hernia repair (Right, 07/23/2018); and pre-malignant / benign skin lesion excision (N/A, 2/11/2020). Social History:   reports that he quit smoking about 65 years ago. He has never used smokeless tobacco. He reports that he does not drink alcohol and does not use drugs.      Family History:  Son with AR      Current Outpatient Medications on File Prior to Visit   Medication Sig Dispense Refill    hydrALAZINE (APRESOLINE) 50 MG tablet TAKE 1 AND 1/2 TABLETS BY  MOUTH 2 TIMES DAILY 270 tablet 3    losartan (COZAAR) 100 MG tablet TAKE 1 TABLET BY MOUTH  DAILY 90 tablet 3    cloNIDine (CATAPRES) 0.1 MG tablet Take half tablet po qd      carvedilol (COREG) 25 MG tablet TAKE 1 TABLET BY MOUTH  TWICE DAILY 180 tablet 3    hydroCHLOROthiazide (HYDRODIURIL) 12.5 MG tablet Take 1 tablet by mouth daily 30 tablet 6    brimonidine-timolol (COMBIGAN) 0.2-0.5 % ophthalmic solution Place 1 drop into both eyes every 12 hours      terazosin (HYTRIN) 5 MG capsule Take 1 capsule by mouth nightly 30 capsule 6    famotidine (PEPCID) 20 MG tablet Take 1 tablet by mouth daily (Patient taking differently: Take 20 mg by mouth daily TAKES TWICE A DAY) 60 tablet 6    allopurinol (ZYLOPRIM) 300 MG tablet Take 300 mg by mouth daily 1/2 tab daily      simvastatin (ZOCOR) 20 MG tablet Take 20 mg by mouth nightly.  aspirin 81 MG chewable tablet Take 81 mg by mouth daily.  cyanocobalamin 1000 MCG tablet Take 1,000 mcg by mouth daily       No current facility-administered medications on file prior to visit. Review of Systems:   · Constitutional: there has been no unanticipated weight loss. No change in energy or activity level   · Eyes: No visual changes   · ENT: No Headaches, hearing loss or vertigo. No mouth sores or sore throat. · Cardiovascular: Reviewed in HPI  · Respiratory: No cough or wheezing, no sputum production. No hematemesis. · Gastrointestinal: No abdominal pain, appetite loss, blood in stools. No change in bowel or bladder habits. · Genitourinary: No nocturia, dysuria, trouble voiding  · Musculoskeletal:  No gait disturbance  · Integumentary: No rash or pruritis. · Neurological: No headache. No change in gait, balance, coordination, mood, affect, memory, mentation, behavior. · Psychiatric: No anxiety or depression  · Endocrine: No malaise or fever  · Hematologic/Lymphatic: No abnormal bruising or bleeding, blood clots or swollen lymph nodes. · Allergic/Immunologic: No nasal congestion or hives. Physical Examination:    Vitals:    04/25/22 1539 04/25/22 1542   BP: (!) 114/52 (!) 116/50   Site: Left Upper Arm Left Upper Arm   Position: Sitting Sitting   Cuff Size: Medium Adult Medium Adult   Pulse: 60    SpO2: 99%    Weight: 146 lb (66.2 kg)    Height: 5' 7\" (1.702 m)      Body mass index is 22.87 kg/m².      Wt Readings from Last 3 Encounters:   04/25/22 146 lb (66.2 kg) 12/14/21 148 lb (67.1 kg)   11/24/21 148 lb (67.1 kg)     BP Readings from Last 3 Encounters:   04/25/22 (!) 116/50   12/14/21 128/60   11/15/21 (!) 143/63       Constitutional and General Appearance:  appears stated age  Respiratory:  · Normal excursion and expansion without use of accessory muscles  · Resp Auscultation: Normal breath sounds without dullness  Cardiovascular:  · The apical impulses not displaced  · Heart is regular rate and rhythm with normal S1, positive S4, 1/6 PABLO, reversed split S2  · The carotid upstroke is normal, right bruit noted   · JVP is not elevated  · Peripheral pulses are symmetrical  · There is no clubbing, cyanosis of the extremities  · No edema  · Femoral Arteries: 2+ and equal  · Pedal Pulses: 2+ and equal   Abdomen:  · No masses or tenderness  · Normal bowel sounds  Neurological/Psychiatric:  · Alert and oriented x3  · Moves all extremities well  · Exhibits normal gait balance and coordination    Assessment:  1. Abdominal aneurysm without mention of rupture: no longer seeing Dr Shiloh Vega 5/13> 3.6cm, US 2/17: 3.4cm, 12/18 US AAA 3.7cm, US AAA 12/18/20: 4.0cm   2. Cardiomyopathy: Nonischemic. Cath 2004 min disease   GXT 2007 fixed defects. Stable, no signs of CHF on exam   Echo 6/14/19: asymmetric hypertrophy of the septum near to LV outflow tract. Overall left ventricular systolic function appears borderline with EF 50-55% range. 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 Grade I diastolic dysfunction with normal LV filling pressures. E/e\"=12.5. Trivial mitral regurgitation. Aortic valve appears sclerotic but opens adequately. Mild aortic regurgitation. Trivial tricuspid regurgitation. Mild pulmonic regurgitation present. Estimated pulmonary artery systolic pressure is normal at 25-30 mmHg assuming a right atrial pressure of 3 mmHg  Echo 2/14> EF 35%. moderate global hypokinesis.  reversal of E/A inflow velocities across the mitral valve suggesting impaired left ventricular relaxation. aortic valve is mildly calcified, opens well with normal gradients. Mild-to-moderate aortic regurgitation is present. Pressure half-time is calculated at 770 msec. mild mitral and tricuspid regurgitation with RVSP 30 mmHg. Mild-to-moderate pulmonic regurgitation present. Upper limits of normal to mildly dilated left atrium with a volume of 66ml  Echo 9/08>  EF 35%, LVH, mild to moderate AI   3. Hypertension: lower readings at home with dizziness   4. Hyperlipemia: Well controlled on Zocor 20mg. Labs 10/14/21: ; TRIG 95; HDL 41; LDL 48   5. Carotid stenosis: Stable, repeat in June  Doppler 6/18/21> <50% bilateral  Doppler 6/3/16> 16-49% bilateral      Plan:    Cut hydralazine to 50mg BID. They will bring b/p readings in 1 month. Repeat AAA US and echo. If all stable FU 4 months. Scribe's attestation: This note was scribed in the presence of Dr Taryn Calle MD by Judith Cavanaugh RN. The scribe's documentation has been prepared under my direction and personally reviewed by me in its entirety. I confirm that the note above accurately reflects all work, treatment, procedures, and medical decision making performed by me. Thank you for allowing to me to participate in the care of [address] [place] [address]

## 2022-05-20 ENCOUNTER — HOSPITAL ENCOUNTER (OUTPATIENT)
Dept: CARDIOLOGY | Age: 87
Discharge: HOME | End: 2022-05-20
Payer: MEDICARE

## 2022-05-20 DIAGNOSIS — I10 PRIMARY HYPERTENSION: ICD-10-CM

## 2022-05-20 DIAGNOSIS — R42 DIZZINESS: ICD-10-CM

## 2022-05-20 DIAGNOSIS — I42.9 CARDIOMYOPATHY, UNSPECIFIED TYPE (HCC): ICD-10-CM

## 2022-05-20 DIAGNOSIS — I71.40 ABDOMINAL AORTIC ANEURYSM (AAA) WITHOUT RUPTURE: ICD-10-CM

## 2022-05-20 DIAGNOSIS — R06.02 SHORTNESS OF BREATH: ICD-10-CM

## 2022-05-20 LAB
LV EF: 53 %
LVEF MODALITY: NORMAL

## 2022-05-20 PROCEDURE — 93306 TTE W/DOPPLER COMPLETE: CPT

## 2022-05-20 PROCEDURE — 93978 VASCULAR STUDY: CPT

## 2022-06-03 RX ORDER — CARVEDILOL 25 MG/1
TABLET ORAL
Qty: 180 TABLET | Refills: 3 | Status: SHIPPED | OUTPATIENT
Start: 2022-06-03

## 2022-06-03 NOTE — TELEPHONE ENCOUNTER
Received refill request for Carvedilol from [facility].      Last OV: 04/25/2022 w/ DA     Last Refill: 09/02/2021 #180 w/ 3 refills     Next Appointment: 08/29/2022 w/ [id number]

## 2022-06-20 ENCOUNTER — TELEPHONE (OUTPATIENT)
Dept: CARDIOLOGY CLINIC | Age: 87
End: 2022-06-20

## 2022-06-20 NOTE — TELEPHONE ENCOUNTER
Pt called stating his kids are giving him whole pill cloNIDine (CATAPRES) 0.1 MG whole pill daily, pt is asking is he supposed to be taking whole 0.1 mg or 1/2 0.5mg pill? Pt stated don't tell his kids he is checking on them.     Pls advise thank you

## 2022-06-20 NOTE — TELEPHONE ENCOUNTER
Mr. Castillo Lindsay states his average BP at home runs 775-653 systolic and pulse 80-23 . He takes BP twice daily. Feels well. Has been taking one full tablet Clonidine at nighttime- he was not sure if [id number] wanted him on a full tablet or a half tablet. States the tablet is hard to cut in half. Informed him [facility] will address Clonidine dose tomorrow on her return. He is comfortable with this.

## 2022-06-21 NOTE — TELEPHONE ENCOUNTER
Called Mr King Led and gave instructions per Dr Pope Read. Advised him to continue to monitor bp and HR and call if they start to rise. He verbalized understanding.

## 2022-07-22 ENCOUNTER — TELEPHONE (OUTPATIENT)
Dept: CARDIOLOGY CLINIC | Age: 87
End: 2022-07-22

## 2022-07-22 RX ORDER — CLONIDINE HYDROCHLORIDE 0.1 MG/1
0.05 TABLET ORAL NIGHTLY
Qty: 30 TABLET | Refills: 1
Start: 2022-07-22

## 2022-07-22 NOTE — TELEPHONE ENCOUNTER
Per Dr Asencio>restart Clonidine 0.1mg 1/2 tab daily and continue to monitor b/p. If he does not have any Clonidine let me know.

## 2022-07-22 NOTE — TELEPHONE ENCOUNTER
Patient called in concerned about his recent bp readings and he states that he has 38 readings above 130, 18 readings above 140 and 8 above 150. Patient states he received clonidine in the mail per pcp order. He states [facility] stopped this medication. Patient is requesting if he could resume taking clonidine due to being concerned about his aneurysm.

## 2022-08-29 ENCOUNTER — OFFICE VISIT (OUTPATIENT)
Dept: CARDIOLOGY CLINIC | Age: 87
End: 2022-08-29
Payer: MEDICARE

## 2022-08-29 VITALS
BODY MASS INDEX: 23.07 KG/M2 | SYSTOLIC BLOOD PRESSURE: 130 MMHG | WEIGHT: 147 LBS | HEART RATE: 60 BPM | OXYGEN SATURATION: 97 % | HEIGHT: 67 IN | DIASTOLIC BLOOD PRESSURE: 60 MMHG

## 2022-08-29 DIAGNOSIS — I10 PRIMARY HYPERTENSION: ICD-10-CM

## 2022-08-29 DIAGNOSIS — E78.2 MIXED HYPERLIPIDEMIA: ICD-10-CM

## 2022-08-29 DIAGNOSIS — I65.23 BILATERAL CAROTID ARTERY STENOSIS: ICD-10-CM

## 2022-08-29 DIAGNOSIS — I42.9 CARDIOMYOPATHY, UNSPECIFIED TYPE (HCC): Primary | ICD-10-CM

## 2022-08-29 PROCEDURE — G8427 DOCREV CUR MEDS BY ELIG CLIN: HCPCS | Performed by: INTERNAL MEDICINE

## 2022-08-29 PROCEDURE — 99214 OFFICE O/P EST MOD 30 MIN: CPT | Performed by: INTERNAL MEDICINE

## 2022-08-29 PROCEDURE — 1036F TOBACCO NON-USER: CPT | Performed by: INTERNAL MEDICINE

## 2022-08-29 PROCEDURE — G8420 CALC BMI NORM PARAMETERS: HCPCS | Performed by: INTERNAL MEDICINE

## 2022-08-29 PROCEDURE — 1123F ACP DISCUSS/DSCN MKR DOCD: CPT | Performed by: INTERNAL MEDICINE

## 2022-08-29 NOTE — PROGRESS NOTES
[facility] [place]   Cardiac Evaluation      Patient: Samantha Dotson  YOB: 1931  Date: 8/29/22     CC; cmp , AR and htn     Referring provider: Mago Velarde    History of Present Illness:   Mr. Bayron Murphy comes to the office today for follow up. His history includes nonischemic cardiomyopathy, mild valvular disease, hypertension, and hyperlipidemia. AAA 4.0cm on screening 12/18/20. Mr. Bayron Murphy is here with his wife and son. He states he is feeling well generally. Luana Daryl denies chest pain, palpitations, BALL, dizziness, or edema. Past Medical History:   has a past medical history of AAA (abdominal aortic aneurysm) ([facility] [address] Arthritis, Blind right eye, Cancer ([facility] [address] Cardiomyopathy ([facility] [address] Gastric ulcer, Glaucoma, Gout, Hyperlipidemia, Hypertension, and PVD (peripheral vascular disease) ([facility] [address] Surgical History:   has a past surgical history that includes Diagnostic Cardiac Cath Lab Procedure; hernia repair; External ear surgery (Left, 08/31/2016); Knee arthroscopy; Vasectomy; Inguinal hernia repair (Right, 07/23/2018); and pre-malignant / benign skin lesion excision (N/A, 2/11/2020). Social History:   reports that he quit smoking about 65 years ago. He has never used smokeless tobacco. He reports that he does not drink alcohol and does not use drugs.      Family History:  Son with AR      Current Outpatient Medications on File Prior to Visit   Medication Sig Dispense Refill    cloNIDine (CATAPRES) 0.1 MG tablet Take 0.5 tablets by mouth at bedtime 30 tablet 1    carvedilol (COREG) 25 MG tablet TAKE 1 TABLET BY MOUTH  TWICE DAILY 180 tablet 3    cyanocobalamin 1000 MCG tablet Take 1,000 mcg by mouth daily      hydrALAZINE (APRESOLINE) 50 MG tablet Take 1 tablet by mouth 2 times daily 270 tablet 3    losartan (COZAAR) 100 MG tablet TAKE 1 TABLET BY MOUTH  DAILY 90 tablet 3    hydroCHLOROthiazide (HYDRODIURIL) 12.5 MG tablet Take 1 tablet by mouth daily 30 tablet 6    brimonidine-timolol (COMBIGAN) 0.2-0.5 % ophthalmic solution Place 1 drop into both eyes every 12 hours      terazosin (HYTRIN) 5 MG capsule Take 1 capsule by mouth nightly 30 capsule 6    famotidine (PEPCID) 20 MG tablet Take 1 tablet by mouth daily (Patient taking differently: Take 20 mg by mouth daily TAKES TWICE A DAY) 60 tablet 6    allopurinol (ZYLOPRIM) 300 MG tablet Take 300 mg by mouth daily 1/2 tab daily      simvastatin (ZOCOR) 20 MG tablet Take 20 mg by mouth nightly. aspirin 81 MG chewable tablet Take 81 mg by mouth daily. No current facility-administered medications on file prior to visit. Review of Systems:   Constitutional: there has been no unanticipated weight loss. No change in energy or activity level   Eyes: No visual changes   ENT: No Headaches, hearing loss or vertigo. No mouth sores or sore throat. Cardiovascular: Reviewed in HPI  Respiratory: No cough or wheezing, no sputum production. No hematemesis. Gastrointestinal: No abdominal pain, appetite loss, blood in stools. No change in bowel or bladder habits. Genitourinary: No nocturia, dysuria, trouble voiding  Musculoskeletal:  No gait disturbance  Integumentary: No rash or pruritis. Neurological: No headache. No change in gait, balance, coordination, mood, affect, memory, mentation, behavior. Psychiatric: No anxiety or depression  Endocrine: No malaise or fever  Hematologic/Lymphatic: No abnormal bruising or bleeding, blood clots or swollen lymph nodes. Allergic/Immunologic: No nasal congestion or hives. Physical Examination:    Vitals:    08/29/22 1551   BP: 130/60   Site: Left Upper Arm   Position: Sitting   Cuff Size: Medium Adult   Pulse: 60   SpO2: 97%   Weight: 147 lb (66.7 kg)   Height: 5' 7\" (1.702 m)     Body mass index is 23.02 kg/m².      Wt Readings from Last 3 Encounters:   08/29/22 147 lb (66.7 kg)   04/25/22 146 lb (66.2 kg)   12/14/21 148 lb (67.1 kg)     BP Readings from Last 3 Encounters:   08/29/22 130/60 04/25/22 (!) 116/50   12/14/21 128/60       Constitutional and General Appearance:  appears stated age  Respiratory:  Normal excursion and expansion without use of accessory muscles  Resp Auscultation: Normal breath sounds without dullness  Cardiovascular: The apical impulses not displaced  Heart is regular rate and rhythm with normal S1, positive S4, 1/6 PABLO, reversed split S2  The carotid upstroke is normal, right bruit noted   JVP is not elevated  Peripheral pulses are symmetrical  There is no clubbing, cyanosis of the extremities  No edema  Femoral Arteries: 2+ and equal  Pedal Pulses: 2+ and equal   Abdomen:  No masses or tenderness  Normal bowel sounds  Neurological/Psychiatric:  Alert and oriented x3  Moves all extremities well  Exhibits normal gait balance and coordination    Assessment:  1. Abdominal aneurysm without mention of rupture: no longer seeing Dr Dontrell Vuong  CT 5/13> 3.6cm, US 2/17: 3.4cm, 12/18 US AAA 3.7cm, US AAA 12/18/20: 4.0cm  Unchanged on US 5/20/22   2. Cardiomyopathy: Nonischemic. Cath 2004 min disease   GXT 2007 fixed defects. Stable, no signs of CHF on exam   Echo 5/20/22: There is asymmetric hypertrophy of the basal septum. Overall left ventricular systolic function appears borderline normal with EF 50-55% range. Abnormal mid to distal septal motion is present  Diastolic filling parameters suggest grade I diastolic dysfunction. E/e\"=20.1. Mitral annular calcification is present. Mild mitral regurgitation. The aortic valve is mildly thickened/calcified but opens well with normal gradients. Mild aortic regurgitation. Trivial tricuspid regurgitation. Trivial pulmonic regurgitation present. Estimated pulmonary artery systolic pressure is normal at 25 mmHg assuming a right atrial pressure of 3 mmHg. Echo 6/14/19: asymmetric hypertrophy of the septum near to LV outflow tract. Overall left ventricular systolic function appears borderline with EF 50-55% range.  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 Grade I diastolic dysfunction with normal LV filling pressures. E/e\"=12.5. Trivial mitral regurgitation. Aortic valve appears sclerotic but opens adequately. Mild aortic regurgitation. Trivial tricuspid regurgitation. Mild pulmonic regurgitation present. Estimated pulmonary artery systolic pressure is normal at 25-30 mmHg assuming a right atrial pressure of 3 mmHg  Echo 2/14> EF 35%. moderate global hypokinesis. reversal of E/A inflow velocities across the mitral valve suggesting impaired left ventricular relaxation. aortic valve is mildly calcified, opens well with normal gradients. Mild-to-moderate aortic regurgitation is present. Pressure half-time is calculated at 770 msec. mild mitral and tricuspid regurgitation with RVSP 30 mmHg. Mild-to-moderate pulmonic regurgitation present. Upper limits of normal to mildly dilated left atrium with a volume of 66ml  Echo 9/08>  EF 35%, LVH, mild to moderate AI   3. Hypertension: lower readings at home with dizziness   4. Hyperlipemia: Well controlled on Zocor 20mg. Labs 7/11/22: ; TRIG 84; HDL 46; LDL 60   5. Carotid stenosis: Stable  Doppler 6/18/21> <50% bilateral  Doppler 6/3/16> 16-49% bilateral      Plan:   Gautam appears stable. No med changes. FU March. Scribe's attestation: This note was scribed in the presence of Dr Giancarlo Spain MD by Phil Halsted, RN. The scribe's documentation has been prepared under my direction and personally reviewed by me in its entirety. I confirm that the note above accurately reflects all work, treatment, procedures, and medical decision making performed by me. Thank you for allowing to me to participate in the care of [address] [place] [address]

## 2022-11-11 RX ORDER — HYDRALAZINE HYDROCHLORIDE 50 MG/1
TABLET, FILM COATED ORAL
Qty: 270 TABLET | Refills: 3 | Status: SHIPPED | OUTPATIENT
Start: 2022-11-11

## 2023-02-01 NOTE — PROGRESS NOTES
[facility]   Cardiac Evaluation      Patient: Lexi García  YOB: 1931  Date: 2/8/23     CC; cmp , AR and htn     Referring provider: John Warren    History of Present Illness:   Mr. Praneeth Velez comes to the office today for follow up. His history includes nonischemic cardiomyopathy, mild valvular disease, hypertension, and hyperlipidemia. AAA 4.0cm on screening 12/18/20. Mr. Praneeth Velez is here with his wife and son. They had Covid and have been sick for 3 weeks. He is coughing up thin white phlegm. He coughs all day and has not been able to lie flat to sleep. Rose Solis does not have much of an appetite and has lost some weight. He denies chest pain, palpitations, or dizziness. His legs have started swelling. Past Medical History:   has a past medical history of AAA (abdominal aortic aneurysm), Arthritis, Blind right eye, Cancer ([facility] [address] Cardiomyopathy ([facility] [address] Gastric ulcer, Glaucoma, Gout, Hyperlipidemia, Hypertension, and PVD (peripheral vascular disease) ([facility] [address] Surgical History:   has a past surgical history that includes Diagnostic Cardiac Cath Lab Procedure; hernia repair; External ear surgery (Left, 08/31/2016); Knee arthroscopy; Vasectomy; Inguinal hernia repair (Right, 07/23/2018); and pre-malignant / benign skin lesion excision (N/A, 2/11/2020). Social History:   reports that he quit smoking about 66 years ago. He has never used smokeless tobacco. He reports that he does not drink alcohol and does not use drugs.      Family History:  Son with AR      Current Outpatient Medications on File Prior to Visit   Medication Sig Dispense Refill    benzonatate (TESSALON) 200 MG capsule Take 200 mg by mouth 3 times daily as needed      hydrALAZINE (APRESOLINE) 50 MG tablet TAKE 1 AND 1/2 TABLETS BY  MOUTH TWICE DAILY 270 tablet 3    cloNIDine (CATAPRES) 0.1 MG tablet Take 0.5 tablets by mouth at bedtime 30 tablet 1    carvedilol (COREG) 25 MG tablet TAKE 1 TABLET BY MOUTH  TWICE DAILY 180 tablet 3    cyanocobalamin 1000 MCG tablet Take 1,000 mcg by mouth daily      losartan (COZAAR) 100 MG tablet TAKE 1 TABLET BY MOUTH  DAILY 90 tablet 3    hydroCHLOROthiazide (HYDRODIURIL) 12.5 MG tablet Take 1 tablet by mouth daily 30 tablet 6    brimonidine-timolol (COMBIGAN) 0.2-0.5 % ophthalmic solution Place 1 drop into both eyes every 12 hours      terazosin (HYTRIN) 5 MG capsule Take 1 capsule by mouth nightly 30 capsule 6    famotidine (PEPCID) 20 MG tablet Take 1 tablet by mouth daily (Patient taking differently: Take 20 mg by mouth daily TAKES TWICE A DAY) 60 tablet 6    allopurinol (ZYLOPRIM) 300 MG tablet Take 300 mg by mouth daily 1/2 tab daily      simvastatin (ZOCOR) 20 MG tablet Take 20 mg by mouth nightly. aspirin 81 MG chewable tablet Take 81 mg by mouth daily. No current facility-administered medications on file prior to visit. Review of Systems:   Constitutional: there has been no unanticipated weight loss. No change in energy or activity level   Eyes: No visual changes   ENT: No Headaches, hearing loss or vertigo. No mouth sores or sore throat. Cardiovascular: Reviewed in HPI  Respiratory: No cough or wheezing, no sputum production. No hematemesis. Gastrointestinal: No abdominal pain, appetite loss, blood in stools. No change in bowel or bladder habits. Genitourinary: No nocturia, dysuria, trouble voiding  Musculoskeletal:  No gait disturbance  Integumentary: No rash or pruritis. Neurological: No headache. No change in gait, balance, coordination, mood, affect, memory, mentation, behavior. Psychiatric: No anxiety or depression  Endocrine: No malaise or fever  Hematologic/Lymphatic: No abnormal bruising or bleeding, blood clots or swollen lymph nodes. Allergic/Immunologic: No nasal congestion or hives.     Physical Examination:    Vitals:    02/08/23 1512 02/08/23 1522   BP: (!) 112/54 (!) 112/54   Site: Left Upper Arm    Position: Sitting    Cuff Size: Medium Adult    Pulse: 70    SpO2: 98%    Weight: 145 lb (65.8 kg)    Height: 5' 7\" (1.702 m)        Body mass index is 22.71 kg/m². Wt Readings from Last 3 Encounters:   02/08/23 145 lb (65.8 kg)   08/29/22 147 lb (66.7 kg)   04/25/22 146 lb (66.2 kg)     BP Readings from Last 3 Encounters:   02/08/23 (!) 112/54   08/29/22 130/60   04/25/22 (!) 116/50       Constitutional and General Appearance:  appears stated age  Respiratory:  Normal excursion and expansion without use of accessory muscles  Resp Auscultation: Normal breath sounds without dullness  Cardiovascular: The apical impulses not displaced  Heart is regular rate and rhythm with normal S1, positive S4, 2/6 PABLO, reversed split S2  The carotid upstroke is normal, right bruit noted   JVP is not elevated  Peripheral pulses are symmetrical  There is no clubbing, cyanosis of the extremities  Trace edema BLE  Femoral Arteries: 2+ and equal  Pedal Pulses: 2+ and equal   Abdomen:  No masses or tenderness  Normal bowel sounds  Neurological/Psychiatric:  Alert and oriented x3  Moves all extremities well  Exhibits normal gait balance and coordination    Assessment:  1. Abdominal aneurysm without mention of rupture: no longer seeing Dr Nicolette Mcconnell  CT 5/13> 3.6cm, US 2/17: 3.4cm, 12/18 US AAA 3.7cm, US AAA 12/18/20: 4.0cm  Unchanged on US 5/20/22   2. Cardiomyopathy: Nonischemic. Cath 2004 min disease   GXT 2007 fixed defects. Stable, no signs of CHF on exam   Echo 5/20/22: There is asymmetric hypertrophy of the basal septum. Overall left ventricular systolic function appears borderline normal with EF 50-55% range. Abnormal mid to distal septal motion is present  Diastolic filling parameters suggest grade I diastolic dysfunction. E/e\"=20.1. Mitral annular calcification is present. Mild mitral regurgitation. The aortic valve is mildly thickened/calcified but opens well with normal gradients. Mild aortic regurgitation. Trivial tricuspid regurgitation. Trivial pulmonic regurgitation present. Estimated pulmonary artery systolic pressure is normal at 25 mmHg assuming a right atrial pressure of 3 mmHg. Echo 6/14/19: asymmetric hypertrophy of the septum near to LV outflow tract. Overall left ventricular systolic function appears borderline with EF 50-55% range. 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 Grade I diastolic dysfunction with normal LV filling pressures. E/e\"=12.5. Trivial mitral regurgitation. Aortic valve appears sclerotic but opens adequately. Mild aortic regurgitation. Trivial tricuspid regurgitation. Mild pulmonic regurgitation present. Estimated pulmonary artery systolic pressure is normal at 25-30 mmHg assuming a right atrial pressure of 3 mmHg  Echo 2/14> EF 35%. moderate global hypokinesis. reversal of E/A inflow velocities across the mitral valve suggesting impaired left ventricular relaxation. aortic valve is mildly calcified, opens well with normal gradients. Mild-to-moderate aortic regurgitation is present. Pressure half-time is calculated at 770 msec. mild mitral and tricuspid regurgitation with RVSP 30 mmHg. Mild-to-moderate pulmonic regurgitation present. Upper limits of normal to mildly dilated left atrium with a volume of 66ml  Echo 9/08>  EF 35%, LVH, mild to moderate AI   3. Hypertension:  stable on Hydralazine, Losartan, HCTZ Hytrin    4. Hyperlipemia:continue Zocor 20mg. at goal  Labs 10/24/22: ; TRIG 90; HDL 47; LDL 56   5. Carotid stenosis: Stable  Doppler 6/18/21> <50% bilateral  Doppler 6/3/16> 16-49% bilateral      Plan:   FU 4 months. Continue same meds. Robitussin with codeine for cough at night. Scribe's attestation: This note was scribed in the presence of Dr Adriel Mims MD by Ana Rodriguez RN. The scribe's documentation has been prepared under my direction and personally reviewed by me in its entirety.  I confirm that the note above accurately reflects all work, treatment, procedures, and medical decision making performed by me. Thank you for allowing to me to participate in the care of [address] [place] [address]

## 2023-02-08 ENCOUNTER — OFFICE VISIT (OUTPATIENT)
Dept: CARDIOLOGY CLINIC | Age: 88
End: 2023-02-08
Payer: MEDICARE

## 2023-02-08 VITALS
HEART RATE: 70 BPM | OXYGEN SATURATION: 98 % | BODY MASS INDEX: 22.76 KG/M2 | SYSTOLIC BLOOD PRESSURE: 112 MMHG | WEIGHT: 145 LBS | DIASTOLIC BLOOD PRESSURE: 54 MMHG | HEIGHT: 67 IN

## 2023-02-08 DIAGNOSIS — I10 PRIMARY HYPERTENSION: ICD-10-CM

## 2023-02-08 DIAGNOSIS — E78.2 MIXED HYPERLIPIDEMIA: ICD-10-CM

## 2023-02-08 DIAGNOSIS — R05.2 SUBACUTE COUGH: Primary | ICD-10-CM

## 2023-02-08 DIAGNOSIS — I65.23 BILATERAL CAROTID ARTERY STENOSIS: ICD-10-CM

## 2023-02-08 DIAGNOSIS — I42.9 CARDIOMYOPATHY, UNSPECIFIED TYPE (HCC): ICD-10-CM

## 2023-02-08 PROCEDURE — G8420 CALC BMI NORM PARAMETERS: HCPCS | Performed by: INTERNAL MEDICINE

## 2023-02-08 PROCEDURE — 99214 OFFICE O/P EST MOD 30 MIN: CPT | Performed by: INTERNAL MEDICINE

## 2023-02-08 PROCEDURE — G8427 DOCREV CUR MEDS BY ELIG CLIN: HCPCS | Performed by: INTERNAL MEDICINE

## 2023-02-08 PROCEDURE — 1036F TOBACCO NON-USER: CPT | Performed by: INTERNAL MEDICINE

## 2023-02-08 PROCEDURE — 1123F ACP DISCUSS/DSCN MKR DOCD: CPT | Performed by: INTERNAL MEDICINE

## 2023-02-08 PROCEDURE — G8484 FLU IMMUNIZE NO ADMIN: HCPCS | Performed by: INTERNAL MEDICINE

## 2023-02-08 RX ORDER — CODEINE PHOSPHATE AND GUAIFENESIN 10; 100 MG/5ML; MG/5ML
10 SOLUTION ORAL NIGHTLY PRN
Qty: 180 ML | Refills: 0 | Status: SHIPPED | OUTPATIENT
Start: 2023-02-08 | End: 2023-02-26

## 2023-02-08 RX ORDER — BENZONATATE 200 MG/1
200 CAPSULE ORAL 3 TIMES DAILY PRN
COMMUNITY
Start: 2023-02-07

## 2023-02-08 RX ORDER — CODEINE PHOSPHATE AND GUAIFENESIN 10; 100 MG/5ML; MG/5ML
5 SOLUTION ORAL 3 TIMES DAILY PRN
Qty: 420 ML | Refills: 0 | Status: CANCELLED | OUTPATIENT
Start: 2023-02-08 | End: 2023-02-11

## 2023-05-22 ENCOUNTER — OFFICE VISIT (OUTPATIENT)
Dept: CARDIOLOGY CLINIC | Age: 88
End: 2023-05-22
Payer: MEDICARE

## 2023-05-22 VITALS
BODY MASS INDEX: 22.76 KG/M2 | OXYGEN SATURATION: 99 % | WEIGHT: 145 LBS | SYSTOLIC BLOOD PRESSURE: 112 MMHG | DIASTOLIC BLOOD PRESSURE: 62 MMHG | HEIGHT: 67 IN | HEART RATE: 58 BPM

## 2023-05-22 DIAGNOSIS — E78.2 MIXED HYPERLIPIDEMIA: ICD-10-CM

## 2023-05-22 DIAGNOSIS — I42.9 CARDIOMYOPATHY, UNSPECIFIED TYPE (HCC): Primary | ICD-10-CM

## 2023-05-22 DIAGNOSIS — I10 PRIMARY HYPERTENSION: ICD-10-CM

## 2023-05-22 DIAGNOSIS — I65.23 BILATERAL CAROTID ARTERY STENOSIS: ICD-10-CM

## 2023-05-22 PROCEDURE — 99214 OFFICE O/P EST MOD 30 MIN: CPT | Performed by: INTERNAL MEDICINE

## 2023-05-22 PROCEDURE — 1123F ACP DISCUSS/DSCN MKR DOCD: CPT | Performed by: INTERNAL MEDICINE

## 2023-05-22 PROCEDURE — G8427 DOCREV CUR MEDS BY ELIG CLIN: HCPCS | Performed by: INTERNAL MEDICINE

## 2023-05-22 PROCEDURE — 1036F TOBACCO NON-USER: CPT | Performed by: INTERNAL MEDICINE

## 2023-05-22 PROCEDURE — G8420 CALC BMI NORM PARAMETERS: HCPCS | Performed by: INTERNAL MEDICINE

## 2023-06-20 RX ORDER — CARVEDILOL 25 MG/1
TABLET ORAL
Qty: 180 TABLET | Refills: 3 | Status: SHIPPED | OUTPATIENT
Start: 2023-06-20

## 2023-09-25 ENCOUNTER — OFFICE VISIT (OUTPATIENT)
Dept: CARDIOLOGY CLINIC | Age: 88
End: 2023-09-25
Payer: MEDICARE

## 2023-09-25 VITALS
HEART RATE: 57 BPM | OXYGEN SATURATION: 99 % | DIASTOLIC BLOOD PRESSURE: 52 MMHG | SYSTOLIC BLOOD PRESSURE: 110 MMHG | WEIGHT: 146 LBS | HEIGHT: 67 IN | BODY MASS INDEX: 22.91 KG/M2

## 2023-09-25 DIAGNOSIS — I65.23 BILATERAL CAROTID ARTERY STENOSIS: ICD-10-CM

## 2023-09-25 DIAGNOSIS — E78.2 MIXED HYPERLIPIDEMIA: ICD-10-CM

## 2023-09-25 DIAGNOSIS — I10 PRIMARY HYPERTENSION: ICD-10-CM

## 2023-09-25 DIAGNOSIS — I42.9 CARDIOMYOPATHY, UNSPECIFIED TYPE (HCC): Primary | ICD-10-CM

## 2023-09-25 PROCEDURE — 1036F TOBACCO NON-USER: CPT | Performed by: INTERNAL MEDICINE

## 2023-09-25 PROCEDURE — 1123F ACP DISCUSS/DSCN MKR DOCD: CPT | Performed by: INTERNAL MEDICINE

## 2023-09-25 PROCEDURE — G8427 DOCREV CUR MEDS BY ELIG CLIN: HCPCS | Performed by: INTERNAL MEDICINE

## 2023-09-25 PROCEDURE — 99214 OFFICE O/P EST MOD 30 MIN: CPT | Performed by: INTERNAL MEDICINE

## 2023-09-25 PROCEDURE — G8420 CALC BMI NORM PARAMETERS: HCPCS | Performed by: INTERNAL MEDICINE

## 2023-09-25 NOTE — PROGRESS NOTES
[facility]   Cardiac Evaluation      Patient: Berhane Baca  YOB: 1931  Date: 9/25/23     CC: cmp , AR and HTN     Referring provider: Dakota Lundberg PA-C    History of Present Illness:   Mr. Birdena Jeans comes to the office today for follow up. His history includes nonischemic cardiomyopathy, mild valvular disease, hypertension, and hyperlipidemia. AAA 4.0cm on screening 12/18/20. Mr. Birdena Jeans is here with his wife and son. He works around his house and cleans a room each day. He sleeps fine. Amy Daley denies chest pain, palpitations, dizziness, or edema. He continues to monitor his and his wife's b/p and HR daily. Past Medical History:   has a past medical history of AAA (abdominal aortic aneurysm) ([address]), Arthritis, Blind right eye, Cancer ([address]), Cardiomyopathy ([address]), Gastric ulcer, Glaucoma, Gout, Hyperlipidemia, Hypertension, and PVD (peripheral vascular disease) ([address] Surgical History:   has a past surgical history that includes Diagnostic Cardiac Cath Lab Procedure; hernia repair; External ear surgery (Left, 08/31/2016); Knee arthroscopy; Vasectomy; Inguinal hernia repair (Right, 07/23/2018); and pre-malignant / benign skin lesion excision (N/A, 2/11/2020). Social History:   reports that he quit smoking about 66 years ago. He has never used smokeless tobacco. He reports that he does not drink alcohol and does not use drugs.      Family History:  Son with AR      Current Outpatient Medications on File Prior to Visit   Medication Sig Dispense Refill    carvedilol (COREG) 25 MG tablet TAKE 1 TABLET BY MOUTH  TWICE DAILY 180 tablet 3    cloNIDine (CATAPRES) 0.1 MG tablet Take 0.5 tablets by mouth at bedtime 30 tablet 1    cyanocobalamin 1000 MCG tablet Take 1 tablet by mouth daily      losartan (COZAAR) 100 MG tablet TAKE 1 TABLET BY MOUTH  DAILY 90 tablet 3    hydroCHLOROthiazide (HYDRODIURIL) 12.5 MG tablet Take 1 tablet by mouth daily 30 tablet 6    brimonidine-timolol

## 2023-10-18 RX ORDER — LOSARTAN POTASSIUM 100 MG/1
100 TABLET ORAL DAILY
Qty: 90 TABLET | Refills: 3 | Status: SHIPPED | OUTPATIENT
Start: 2023-10-18

## 2024-01-30 ENCOUNTER — TELEPHONE (OUTPATIENT)
Dept: CARDIOLOGY CLINIC | Age: 89
End: 2024-01-30

## 2024-01-30 NOTE — TELEPHONE ENCOUNTER
I let Mr Martínez know I will speak with Dr Asencio regarding taking Omega XL for joints. He says [employer] is advertising how good it is.

## 2024-01-30 NOTE — TELEPHONE ENCOUNTER
The patient phoned wanting to know [facility] opinion on taking Omega XL for him and his wife Rosalie (09/18/29), if it  would be safe for them to take with their medications.   Please call and advise.  Thank you

## 2024-02-23 NOTE — PROGRESS NOTES
[facility]   Cardiac Evaluation      Patient: Gautam Martínez  YOB: 1931  Date: 3/20/24     CC: cmp , AR and HTN     Referring provider: Anita Mejia PA-C    History of Present Illness:   Mr. Martínez comes to the office today for follow up. His history includes nonischemic cardiomyopathy, mild valvular disease, hypertension, and hyperlipidemia.  AAA 4.0cm on screening 12/18/20.      Mr. Martínez is here with his wife and son. He feels fine for the most part. Gautam denies chest pain, palpitations, dizziness, or edema.     Past Medical History:   has a past medical history of AAA (abdominal aortic aneurysm) (HCC), Arthritis, Blind right eye, Cancer (HCC), Cardiomyopathy (HCC), Gastric ulcer, Glaucoma, Gout, Hyperlipidemia, Hypertension, and PVD (peripheral vascular disease) (HCC).    Surgical History:   has a past surgical history that includes Diagnostic Cardiac Cath Lab Procedure; hernia repair; External ear surgery (Left, 08/31/2016); Knee arthroscopy; Vasectomy; Inguinal hernia repair (Right, 07/23/2018); and pre-malignant / benign skin lesion excision (N/A, 2/11/2020).     Social History:   reports that he quit smoking about 67 years ago. He has never used smokeless tobacco. He reports that he does not drink alcohol and does not use drugs.     Family History:  Son with AR      Current Outpatient Medications on File Prior to Visit   Medication Sig Dispense Refill    losartan (COZAAR) 100 MG tablet Take 1 tablet by mouth daily (Patient taking differently: Take 1 tablet by mouth daily 1/2 tab daily) 90 tablet 3    carvedilol (COREG) 25 MG tablet TAKE 1 TABLET BY MOUTH  TWICE DAILY 180 tablet 3    hydroCHLOROthiazide (HYDRODIURIL) 12.5 MG tablet Take 1 tablet by mouth daily 30 tablet 6    terazosin (HYTRIN) 5 MG capsule Take 1 capsule by mouth nightly 30 capsule 6    famotidine (PEPCID) 20 MG tablet Take 1 tablet by mouth daily (Patient taking differently: Take 1 tablet by mouth daily

## 2024-03-20 ENCOUNTER — OFFICE VISIT (OUTPATIENT)
Dept: CARDIOLOGY CLINIC | Age: 89
End: 2024-03-20
Payer: MEDICARE

## 2024-03-20 VITALS
HEIGHT: 67 IN | DIASTOLIC BLOOD PRESSURE: 54 MMHG | BODY MASS INDEX: 23.07 KG/M2 | SYSTOLIC BLOOD PRESSURE: 118 MMHG | WEIGHT: 147 LBS | HEART RATE: 59 BPM | OXYGEN SATURATION: 98 %

## 2024-03-20 DIAGNOSIS — E78.2 MIXED HYPERLIPIDEMIA: ICD-10-CM

## 2024-03-20 DIAGNOSIS — I65.23 BILATERAL CAROTID ARTERY STENOSIS: ICD-10-CM

## 2024-03-20 DIAGNOSIS — I71.40 ABDOMINAL AORTIC ANEURYSM (AAA) WITHOUT RUPTURE, UNSPECIFIED PART (HCC): ICD-10-CM

## 2024-03-20 DIAGNOSIS — I42.9 CARDIOMYOPATHY, UNSPECIFIED TYPE (HCC): ICD-10-CM

## 2024-03-20 DIAGNOSIS — I10 PRIMARY HYPERTENSION: Primary | ICD-10-CM

## 2024-03-20 PROCEDURE — G8420 CALC BMI NORM PARAMETERS: HCPCS | Performed by: INTERNAL MEDICINE

## 2024-03-20 PROCEDURE — 99214 OFFICE O/P EST MOD 30 MIN: CPT | Performed by: INTERNAL MEDICINE

## 2024-03-20 PROCEDURE — G8484 FLU IMMUNIZE NO ADMIN: HCPCS | Performed by: INTERNAL MEDICINE

## 2024-03-20 PROCEDURE — 93000 ELECTROCARDIOGRAM COMPLETE: CPT | Performed by: INTERNAL MEDICINE

## 2024-03-20 PROCEDURE — 1036F TOBACCO NON-USER: CPT | Performed by: INTERNAL MEDICINE

## 2024-03-20 PROCEDURE — 1123F ACP DISCUSS/DSCN MKR DOCD: CPT | Performed by: INTERNAL MEDICINE

## 2024-03-20 PROCEDURE — G8427 DOCREV CUR MEDS BY ELIG CLIN: HCPCS | Performed by: INTERNAL MEDICINE

## 2024-04-05 ENCOUNTER — HOSPITAL ENCOUNTER (OUTPATIENT)
Dept: CARDIOLOGY | Age: 89
Discharge: HOME | End: 2024-04-05
Payer: MEDICARE

## 2024-04-05 DIAGNOSIS — I71.40 ABDOMINAL AORTIC ANEURYSM (AAA) WITHOUT RUPTURE, UNSPECIFIED PART (HCC): ICD-10-CM

## 2024-04-05 PROCEDURE — 93978 VASCULAR STUDY: CPT

## 2024-07-08 RX ORDER — CARVEDILOL 25 MG/1
TABLET ORAL
Qty: 180 TABLET | Refills: 3 | Status: SHIPPED | OUTPATIENT
Start: 2024-07-08

## 2024-07-08 NOTE — TELEPHONE ENCOUNTER
7/10/2024 next OV with [facility]    3/20/2024 last Ov with [facility]    Labs in care Everywhere from the hospital stay at [facility].

## 2024-07-10 ENCOUNTER — OFFICE VISIT (OUTPATIENT)
Dept: CARDIOLOGY CLINIC | Age: 89
End: 2024-07-10
Payer: MEDICARE

## 2024-07-10 VITALS
BODY MASS INDEX: 23.7 KG/M2 | DIASTOLIC BLOOD PRESSURE: 60 MMHG | SYSTOLIC BLOOD PRESSURE: 132 MMHG | WEIGHT: 151 LBS | OXYGEN SATURATION: 97 % | HEART RATE: 62 BPM | HEIGHT: 67 IN

## 2024-07-10 DIAGNOSIS — I10 PRIMARY HYPERTENSION: ICD-10-CM

## 2024-07-10 DIAGNOSIS — I42.9 CARDIOMYOPATHY, UNSPECIFIED TYPE (HCC): Primary | ICD-10-CM

## 2024-07-10 DIAGNOSIS — I71.40 ABDOMINAL AORTIC ANEURYSM (AAA) WITHOUT RUPTURE, UNSPECIFIED PART (HCC): ICD-10-CM

## 2024-07-10 DIAGNOSIS — E78.2 MIXED HYPERLIPIDEMIA: ICD-10-CM

## 2024-07-10 PROCEDURE — G8427 DOCREV CUR MEDS BY ELIG CLIN: HCPCS | Performed by: INTERNAL MEDICINE

## 2024-07-10 PROCEDURE — G8420 CALC BMI NORM PARAMETERS: HCPCS | Performed by: INTERNAL MEDICINE

## 2024-07-10 PROCEDURE — 1123F ACP DISCUSS/DSCN MKR DOCD: CPT | Performed by: INTERNAL MEDICINE

## 2024-07-10 PROCEDURE — 99214 OFFICE O/P EST MOD 30 MIN: CPT | Performed by: INTERNAL MEDICINE

## 2024-07-10 PROCEDURE — 1036F TOBACCO NON-USER: CPT | Performed by: INTERNAL MEDICINE

## 2024-07-10 RX ORDER — DORZOLAMIDE HCL 20 MG/ML
SOLUTION/ DROPS OPHTHALMIC
COMMUNITY
Start: 2024-05-15 | End: 2024-07-10

## 2024-07-10 RX ORDER — TERAZOSIN 10 MG/1
CAPSULE ORAL
COMMUNITY
Start: 2024-07-03

## 2024-07-10 RX ORDER — CLONIDINE HYDROCHLORIDE 0.1 MG/1
TABLET ORAL
Qty: 60 TABLET | Refills: 3
Start: 2024-07-10

## 2024-07-10 RX ORDER — FINASTERIDE 5 MG/1
TABLET, FILM COATED ORAL
COMMUNITY
Start: 2024-07-03

## 2024-07-10 RX ORDER — NETARSUDIL AND LATANOPROST OPHTHALMIC SOLUTION, 0.02%/0.005% .2; .05 MG/ML; MG/ML
SOLUTION/ DROPS OPHTHALMIC; TOPICAL
COMMUNITY
Start: 2024-06-25

## 2024-07-10 RX ORDER — LATANOPROST 50 UG/ML
SOLUTION/ DROPS OPHTHALMIC
COMMUNITY
Start: 2024-05-29

## 2024-07-10 NOTE — PROGRESS NOTES
Body mass index is 23.65 kg/m².     Wt Readings from Last 3 Encounters:   07/10/24 68.5 kg (151 lb)   03/20/24 66.7 kg (147 lb)   09/25/23 66.2 kg (146 lb)     BP Readings from Last 3 Encounters:   07/10/24 132/60   03/20/24 (!) 118/54   09/25/23 (!) 110/52       Constitutional and General Appearance:  appears stated age  Respiratory:  Normal excursion and expansion without use of accessory muscles  Resp Auscultation: crackles in the bases  Cardiovascular:  The apical impulses not displaced  Heart is regular rate and rhythm with normal S1, positive S4, 2/6 PABLO, reversed split S2  The carotid upstroke is normal, right bruit noted   JVP is not elevated  Peripheral pulses are symmetrical  There is no clubbing, cyanosis of the extremities  No edema   Femoral Arteries: 2+ and equal  Pedal Pulses: 2+ and equal   Abdomen:  No masses or tenderness  Normal bowel sounds  Neurological/Psychiatric:  Alert and oriented x3  Moves all extremities well  Exhibits normal gait balance and coordination    Assessment:  1. Abdominal aneurysm without mention of rupture: no longer seeing Dr Pederson  CT 5/13> 3.6cm, US 2/17: 3.4cm, 12/18 US AAA 3.7cm, US AAA 12/18/20: 3.95cm 4/5/24-  Unchanged from US 5/20/22   2. Cardiomyopathy: Nonischemic. Cath 2004 min disease   GXT 2007 fixed defects.   Stable, no signs of CHF on exam   Echo 5/20/22: There is asymmetric hypertrophy of the basal septum. Overall left ventricular systolic function appears borderline normal with EF 50-55% range. Abnormal mid to distal septal motion is present  Diastolic filling parameters suggest grade I diastolic dysfunction.E/e\"=20.1. Mitral annular calcification is present. Mild mitral regurgitation. The aortic valve is mildly thickened/calcified but opens well with normal gradients. Mild aortic regurgitation.Trivial tricuspid regurgitation.Trivial pulmonic regurgitation present. Estimated pulmonary artery systolic pressure is normal at 25 mmHg assuming a

## 2024-07-23 RX ORDER — ALLOPURINOL 100 MG/1
100 TABLET ORAL DAILY
Qty: 90 TABLET | Refills: 1 | Status: SHIPPED | OUTPATIENT
Start: 2024-07-23

## 2024-08-05 ENCOUNTER — OFFICE VISIT (OUTPATIENT)
Dept: PRIMARY CARE CLINIC | Age: 89
End: 2024-08-05
Payer: MEDICARE

## 2024-08-05 VITALS
WEIGHT: 145.8 LBS | BODY MASS INDEX: 22.88 KG/M2 | SYSTOLIC BLOOD PRESSURE: 148 MMHG | TEMPERATURE: 98.4 F | OXYGEN SATURATION: 94 % | HEIGHT: 67 IN | HEART RATE: 50 BPM | DIASTOLIC BLOOD PRESSURE: 64 MMHG | RESPIRATION RATE: 14 BRPM

## 2024-08-05 DIAGNOSIS — I71.43 INFRARENAL ABDOMINAL AORTIC ANEURYSM (AAA) WITHOUT RUPTURE (HCC): ICD-10-CM

## 2024-08-05 DIAGNOSIS — I42.9 CARDIOMYOPATHY, UNSPECIFIED TYPE (HCC): ICD-10-CM

## 2024-08-05 DIAGNOSIS — I10 PRIMARY HYPERTENSION: Primary | ICD-10-CM

## 2024-08-05 DIAGNOSIS — E78.2 MIXED HYPERLIPIDEMIA: ICD-10-CM

## 2024-08-05 DIAGNOSIS — Z87.448 HISTORY OF ACUTE RENAL FAILURE: ICD-10-CM

## 2024-08-05 DIAGNOSIS — C44.209 CANCER OF SKIN OF AURICLE OF LEFT EAR: ICD-10-CM

## 2024-08-05 DIAGNOSIS — N40.0 BENIGN PROSTATIC HYPERPLASIA WITHOUT LOWER URINARY TRACT SYMPTOMS: ICD-10-CM

## 2024-08-05 PROCEDURE — G8420 CALC BMI NORM PARAMETERS: HCPCS | Performed by: INTERNAL MEDICINE

## 2024-08-05 PROCEDURE — G8427 DOCREV CUR MEDS BY ELIG CLIN: HCPCS | Performed by: INTERNAL MEDICINE

## 2024-08-05 PROCEDURE — 99204 OFFICE O/P NEW MOD 45 MIN: CPT | Performed by: INTERNAL MEDICINE

## 2024-08-05 PROCEDURE — 1123F ACP DISCUSS/DSCN MKR DOCD: CPT | Performed by: INTERNAL MEDICINE

## 2024-08-05 PROCEDURE — 1036F TOBACCO NON-USER: CPT | Performed by: INTERNAL MEDICINE

## 2024-08-05 SDOH — ECONOMIC STABILITY: HOUSING INSECURITY
IN THE LAST 12 MONTHS, WAS THERE A TIME WHEN YOU DID NOT HAVE A STEADY PLACE TO SLEEP OR SLEPT IN A SHELTER (INCLUDING NOW)?: NO

## 2024-08-05 SDOH — ECONOMIC STABILITY: INCOME INSECURITY: HOW HARD IS IT FOR YOU TO PAY FOR THE VERY BASICS LIKE FOOD, HOUSING, MEDICAL CARE, AND HEATING?: NOT HARD AT ALL

## 2024-08-05 SDOH — ECONOMIC STABILITY: FOOD INSECURITY: WITHIN THE PAST 12 MONTHS, THE FOOD YOU BOUGHT JUST DIDN'T LAST AND YOU DIDN'T HAVE MONEY TO GET MORE.: NEVER TRUE

## 2024-08-05 SDOH — ECONOMIC STABILITY: FOOD INSECURITY: WITHIN THE PAST 12 MONTHS, YOU WORRIED THAT YOUR FOOD WOULD RUN OUT BEFORE YOU GOT MONEY TO BUY MORE.: NEVER TRUE

## 2024-08-05 ASSESSMENT — ENCOUNTER SYMPTOMS
EYE REDNESS: 0
TROUBLE SWALLOWING: 0
SINUS PRESSURE: 0
CHEST TIGHTNESS: 0
SORE THROAT: 0
BLOOD IN STOOL: 0
ABDOMINAL PAIN: 0
EYE PAIN: 0
WHEEZING: 0
SHORTNESS OF BREATH: 0
BACK PAIN: 0
COUGH: 0
CONSTIPATION: 0
DIARRHEA: 0
COLOR CHANGE: 0
NAUSEA: 0
ABDOMINAL DISTENTION: 0
VOMITING: 0

## 2024-08-05 ASSESSMENT — PATIENT HEALTH QUESTIONNAIRE - PHQ9
SUM OF ALL RESPONSES TO PHQ QUESTIONS 1-9: 1
SUM OF ALL RESPONSES TO PHQ9 QUESTIONS 1 & 2: 1
SUM OF ALL RESPONSES TO PHQ QUESTIONS 1-9: 1
1. LITTLE INTEREST OR PLEASURE IN DOING THINGS: NOT AT ALL
2. FEELING DOWN, DEPRESSED OR HOPELESS: SEVERAL DAYS

## 2024-08-05 NOTE — ASSESSMENT & PLAN NOTE
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  Will follow-up with  specialist

## 2024-08-05 NOTE — ASSESSMENT & PLAN NOTE
BP elevated   Continue anti-hypertensive medication as documented in your medication list   To verify blood pressure cuff accuracy  To keep outpatient BP log,  counseled on exercise and diet (including DASH diet)  Goal to achieve appropriate BMI  Patient agreed with plan with verbal understanding

## 2024-08-05 NOTE — ASSESSMENT & PLAN NOTE
appears compensated  Continue carvedilol.  Avoid salt in diet weigh self every morning.  Follow-up with cardiologist

## 2024-08-05 NOTE — ASSESSMENT & PLAN NOTE
There is aneurysmal dilatation of the infrarenal  abdominal aorta measuring 3.95 cm X 3.7 cm. appears stable from previous exam  dated 5/20/2022.  Follow-up with specialist

## 2024-08-05 NOTE — PROGRESS NOTES
Gautam Martínez (:  1931) is a 89 y.o. male,New patient, here for evaluation of the following chief complaint(s):  New Patient      SUBJECTIVE/OBJECTIVE:  HPI    This is a 89 y.o. male patient who comes in for establishment of care. The patient has a past medical history of -    1.  HTN  - Patient's BP is elevated today. Patient denies complaints or symptoms today.  Denies severe headache, slurred speech, muscle weakness, chest pain on exertion or rest, paroxysmal nocturnal dyspnea, shortness of breath on exertion or at rest, palpitations, presyncope, diaphoresis, leg swelling. Patient mentions he is adherent with treatment.     2.  Cardiomyopathy -history of nonischemic cardiomyopathy.  Patient denies shortness of breath on exertion or at rest.  He does have some foot and ankle swelling.  Denies chest pain palpitations.  Patient was discharged with MIRTA on CKD his diuretics were discontinued.  He has followed up with his cardiologist since discharge.     3. History of acute renal failure -patient was admitted to [place] 2024 for acute urinary retention.  Patient developed postobstructive MIRTA.  Today patient is approaching baseline he says he has less discomfort when voiding urine since catheter has been ordered.  No other complaints or issues.      4.  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     Health Maintenance    Annual retinal eye exam - Patient due  will need to schedule   Annual Dentist visit -   Tobacco smoking  - NO  Alcohol Misuse - NO  Illicit Drug Use- NO  Healthy Diet and physical activity -  YES  Obesity Screen- screened  Wears seat belt-  YES  End of life directives discussed with patient.-Mentions she has  a will/or/an advanced directives, an all her paperwork is in order.     The ASCVD Risk score (Wenceslao DK, et al., 2019) failed to calculate for the following reasons:    The

## 2024-08-06 DIAGNOSIS — Z87.448 HISTORY OF ACUTE RENAL FAILURE: ICD-10-CM

## 2024-08-07 LAB
ALBUMIN SERPL-MCNC: 3.5 G/DL (ref 3.4–5)
ANION GAP SERPL CALCULATED.3IONS-SCNC: 11 MMOL/L (ref 3–16)
BUN SERPL-MCNC: 18 MG/DL (ref 7–20)
CALCIUM SERPL-MCNC: 8.5 MG/DL (ref 8.3–10.6)
CHLORIDE SERPL-SCNC: 107 MMOL/L (ref 99–110)
CO2 SERPL-SCNC: 28 MMOL/L (ref 21–32)
CREAT SERPL-MCNC: 1.4 MG/DL (ref 0.8–1.3)
GFR SERPLBLD CREATININE-BSD FMLA CKD-EPI: 47 ML/MIN/{1.73_M2}
GLUCOSE SERPL-MCNC: 106 MG/DL (ref 70–99)
PHOSPHATE SERPL-MCNC: 3.2 MG/DL (ref 2.5–4.9)
POTASSIUM SERPL-SCNC: 4.1 MMOL/L (ref 3.5–5.1)
SODIUM SERPL-SCNC: 146 MMOL/L (ref 136–145)

## 2024-08-08 NOTE — RESULT ENCOUNTER NOTE
Please let patient know that his kidney function remains stable and has not worsened since hospital discharge.

## 2024-08-09 ENCOUNTER — TELEPHONE (OUTPATIENT)
Dept: PRIMARY CARE CLINIC | Age: 89
End: 2024-08-09

## 2024-08-09 NOTE — TELEPHONE ENCOUNTER
Son called stating he did give his dad the BP medication per Dr. Bear. Son states his BP dropped 80/60 so he stopped giving him the medication. The son states they take his BP every day twice a day at 12 pm and 9 pm. His BP has been running fine at around 120/78.     He wanted to check to see if they still need to bring him in for a BP check since this is already a routine they practice for his cardiologist? Is it okay to cancel the nurse appointment scheduled?        BACTRIM - patient is having side effects/reaction. - was given Bactrim from Dr. Arias and patient was taken off due to reaction/side effects.

## 2024-08-12 ENCOUNTER — TELEPHONE (OUTPATIENT)
Dept: CARDIOLOGY CLINIC | Age: 89
End: 2024-08-12

## 2024-08-12 RX ORDER — OFLOXACIN 300 MG/1
300 TABLET, COATED ORAL 2 TIMES DAILY
Qty: 14 TABLET | Refills: 0 | Status: SHIPPED | OUTPATIENT
Start: 2024-08-12 | End: 2024-08-19

## 2024-08-12 NOTE — TELEPHONE ENCOUNTER
Dr Asencio is prescribing Floxin 300mg BID x7 days for urinary infection and burning upon urination. Rx e-prescribed to [facility]

## 2024-09-10 ENCOUNTER — OFFICE VISIT (OUTPATIENT)
Dept: PRIMARY CARE CLINIC | Age: 89
End: 2024-09-10
Payer: MEDICARE

## 2024-09-10 DIAGNOSIS — E78.2 MIXED HYPERLIPIDEMIA: ICD-10-CM

## 2024-09-10 DIAGNOSIS — N40.0 BENIGN PROSTATIC HYPERPLASIA WITHOUT LOWER URINARY TRACT SYMPTOMS: ICD-10-CM

## 2024-09-10 DIAGNOSIS — Z87.39 HISTORY OF GOUT: ICD-10-CM

## 2024-09-10 DIAGNOSIS — C44.209 CANCER OF SKIN OF AURICLE OF LEFT EAR: ICD-10-CM

## 2024-09-10 DIAGNOSIS — Z00.00 INITIAL MEDICARE ANNUAL WELLNESS VISIT: Primary | ICD-10-CM

## 2024-09-10 DIAGNOSIS — I10 PRIMARY HYPERTENSION: ICD-10-CM

## 2024-09-10 DIAGNOSIS — I42.9 CARDIOMYOPATHY, UNSPECIFIED TYPE (HCC): ICD-10-CM

## 2024-09-10 DIAGNOSIS — R53.83 OTHER FATIGUE: ICD-10-CM

## 2024-09-10 DIAGNOSIS — E55.9 VITAMIN D DEFICIENCY: ICD-10-CM

## 2024-09-10 DIAGNOSIS — I71.43 INFRARENAL ABDOMINAL AORTIC ANEURYSM (AAA) WITHOUT RUPTURE (HCC): ICD-10-CM

## 2024-09-10 PROCEDURE — G0438 PPPS, INITIAL VISIT: HCPCS | Performed by: INTERNAL MEDICINE

## 2024-09-10 PROCEDURE — 1123F ACP DISCUSS/DSCN MKR DOCD: CPT | Performed by: INTERNAL MEDICINE

## 2024-09-10 ASSESSMENT — LIFESTYLE VARIABLES
HOW MANY STANDARD DRINKS CONTAINING ALCOHOL DO YOU HAVE ON A TYPICAL DAY: PATIENT DOES NOT DRINK
HOW OFTEN DO YOU HAVE A DRINK CONTAINING ALCOHOL: NEVER

## 2024-09-10 ASSESSMENT — PATIENT HEALTH QUESTIONNAIRE - PHQ9
SUM OF ALL RESPONSES TO PHQ9 QUESTIONS 1 & 2: 0
SUM OF ALL RESPONSES TO PHQ QUESTIONS 1-9: 0
SUM OF ALL RESPONSES TO PHQ QUESTIONS 1-9: 0
2. FEELING DOWN, DEPRESSED OR HOPELESS: NOT AT ALL
1. LITTLE INTEREST OR PLEASURE IN DOING THINGS: NOT AT ALL
SUM OF ALL RESPONSES TO PHQ QUESTIONS 1-9: 0
SUM OF ALL RESPONSES TO PHQ QUESTIONS 1-9: 0

## 2024-09-13 VITALS
DIASTOLIC BLOOD PRESSURE: 60 MMHG | RESPIRATION RATE: 14 BRPM | HEIGHT: 67 IN | BODY MASS INDEX: 23.59 KG/M2 | TEMPERATURE: 97.3 F | SYSTOLIC BLOOD PRESSURE: 124 MMHG | WEIGHT: 150.3 LBS | HEART RATE: 89 BPM | OXYGEN SATURATION: 97 %

## 2024-09-13 DIAGNOSIS — Z87.39 HISTORY OF GOUT: ICD-10-CM

## 2024-09-13 DIAGNOSIS — E78.2 MIXED HYPERLIPIDEMIA: ICD-10-CM

## 2024-09-13 DIAGNOSIS — Z00.00 INITIAL MEDICARE ANNUAL WELLNESS VISIT: ICD-10-CM

## 2024-09-13 DIAGNOSIS — E55.9 VITAMIN D DEFICIENCY: ICD-10-CM

## 2024-09-13 DIAGNOSIS — I10 PRIMARY HYPERTENSION: ICD-10-CM

## 2024-09-13 DIAGNOSIS — R53.83 OTHER FATIGUE: ICD-10-CM

## 2024-09-13 LAB
25(OH)D3 SERPL-MCNC: 15.8 NG/ML
ALBUMIN SERPL-MCNC: 3.7 G/DL (ref 3.4–5)
ALBUMIN/GLOB SERPL: 1.7 {RATIO} (ref 1.1–2.2)
ALP SERPL-CCNC: 136 U/L (ref 40–129)
ALT SERPL-CCNC: 13 U/L (ref 10–40)
ANION GAP SERPL CALCULATED.3IONS-SCNC: 7 MMOL/L (ref 3–16)
AST SERPL-CCNC: 18 U/L (ref 15–37)
BASOPHILS # BLD: 0 K/UL (ref 0–0.2)
BASOPHILS NFR BLD: 0.8 %
BILIRUB SERPL-MCNC: 0.6 MG/DL (ref 0–1)
BUN SERPL-MCNC: 14 MG/DL (ref 7–20)
CALCIUM SERPL-MCNC: 8.8 MG/DL (ref 8.3–10.6)
CHLORIDE SERPL-SCNC: 108 MMOL/L (ref 99–110)
CHOLEST SERPL-MCNC: 99 MG/DL (ref 0–199)
CO2 SERPL-SCNC: 27 MMOL/L (ref 21–32)
CREAT SERPL-MCNC: 1.1 MG/DL (ref 0.8–1.3)
DEPRECATED RDW RBC AUTO: 17.8 % (ref 12.4–15.4)
EOSINOPHIL # BLD: 0.1 K/UL (ref 0–0.6)
EOSINOPHIL NFR BLD: 2.3 %
GFR SERPLBLD CREATININE-BSD FMLA CKD-EPI: 63 ML/MIN/{1.73_M2}
GLUCOSE SERPL-MCNC: 91 MG/DL (ref 70–99)
HCT VFR BLD AUTO: 31.1 % (ref 40.5–52.5)
HDLC SERPL-MCNC: 40 MG/DL (ref 40–60)
HGB BLD-MCNC: 10.1 G/DL (ref 13.5–17.5)
LDLC SERPL CALC-MCNC: 43 MG/DL
LYMPHOCYTES # BLD: 1.4 K/UL (ref 1–5.1)
LYMPHOCYTES NFR BLD: 34.1 %
MCH RBC QN AUTO: 29.1 PG (ref 26–34)
MCHC RBC AUTO-ENTMCNC: 32.5 G/DL (ref 31–36)
MCV RBC AUTO: 89.5 FL (ref 80–100)
MONOCYTES # BLD: 0.3 K/UL (ref 0–1.3)
MONOCYTES NFR BLD: 7.8 %
NEUTROPHILS # BLD: 2.3 K/UL (ref 1.7–7.7)
NEUTROPHILS NFR BLD: 55 %
PLATELET # BLD AUTO: 81 K/UL (ref 135–450)
PMV BLD AUTO: 9.1 FL (ref 5–10.5)
POTASSIUM SERPL-SCNC: 4.1 MMOL/L (ref 3.5–5.1)
PROT SERPL-MCNC: 5.9 G/DL (ref 6.4–8.2)
RBC # BLD AUTO: 3.48 M/UL (ref 4.2–5.9)
SODIUM SERPL-SCNC: 142 MMOL/L (ref 136–145)
TRIGL SERPL-MCNC: 80 MG/DL (ref 0–150)
TSH SERPL DL<=0.005 MIU/L-ACNC: 2.26 UIU/ML (ref 0.27–4.2)
URATE SERPL-MCNC: 4.6 MG/DL (ref 3.5–7.2)
VLDLC SERPL CALC-MCNC: 16 MG/DL
WBC # BLD AUTO: 4.2 K/UL (ref 4–11)

## 2024-09-16 DIAGNOSIS — D64.9 LOW HEMOGLOBIN: ICD-10-CM

## 2024-09-16 DIAGNOSIS — E55.9 VITAMIN D DEFICIENCY: Primary | ICD-10-CM

## 2024-09-16 DIAGNOSIS — D69.6 THROMBOCYTOPENIA (HCC): Primary | ICD-10-CM

## 2024-09-16 RX ORDER — VITAMIN K2 90 MCG
1 CAPSULE ORAL
Qty: 90 CAPSULE | Refills: 3 | Status: SHIPPED | OUTPATIENT
Start: 2024-09-16

## 2024-09-19 ENCOUNTER — TELEPHONE (OUTPATIENT)
Dept: CARDIOLOGY CLINIC | Age: 89
End: 2024-09-19

## 2024-09-19 RX ORDER — HYDRALAZINE HYDROCHLORIDE 25 MG/1
25 TABLET, FILM COATED ORAL 2 TIMES DAILY
Qty: 90 TABLET | Refills: 3
Start: 2024-09-19

## 2024-09-23 ENCOUNTER — TELEPHONE (OUTPATIENT)
Dept: PRIMARY CARE CLINIC | Age: 89
End: 2024-09-23

## 2024-09-26 ENCOUNTER — OFFICE VISIT (OUTPATIENT)
Dept: CARDIOLOGY CLINIC | Age: 89
End: 2024-09-26
Payer: MEDICARE

## 2024-09-26 VITALS
DIASTOLIC BLOOD PRESSURE: 66 MMHG | HEIGHT: 67 IN | WEIGHT: 152 LBS | BODY MASS INDEX: 23.86 KG/M2 | OXYGEN SATURATION: 97 % | HEART RATE: 56 BPM | SYSTOLIC BLOOD PRESSURE: 134 MMHG

## 2024-09-26 DIAGNOSIS — I71.40 ABDOMINAL AORTIC ANEURYSM (AAA) WITHOUT RUPTURE, UNSPECIFIED PART (HCC): ICD-10-CM

## 2024-09-26 DIAGNOSIS — I42.9 CARDIOMYOPATHY, UNSPECIFIED TYPE (HCC): Primary | ICD-10-CM

## 2024-09-26 DIAGNOSIS — E78.2 MIXED HYPERLIPIDEMIA: ICD-10-CM

## 2024-09-26 DIAGNOSIS — R06.02 SHORTNESS OF BREATH: ICD-10-CM

## 2024-09-26 DIAGNOSIS — I10 PRIMARY HYPERTENSION: ICD-10-CM

## 2024-09-26 PROCEDURE — G8427 DOCREV CUR MEDS BY ELIG CLIN: HCPCS | Performed by: INTERNAL MEDICINE

## 2024-09-26 PROCEDURE — 1036F TOBACCO NON-USER: CPT | Performed by: INTERNAL MEDICINE

## 2024-09-26 PROCEDURE — 1123F ACP DISCUSS/DSCN MKR DOCD: CPT | Performed by: INTERNAL MEDICINE

## 2024-09-26 PROCEDURE — G8420 CALC BMI NORM PARAMETERS: HCPCS | Performed by: INTERNAL MEDICINE

## 2024-09-26 PROCEDURE — 99214 OFFICE O/P EST MOD 30 MIN: CPT | Performed by: INTERNAL MEDICINE

## 2024-09-26 RX ORDER — CLONIDINE HYDROCHLORIDE 0.1 MG/1
0.1 TABLET ORAL NIGHTLY
Qty: 30 TABLET | Refills: 6
Start: 2024-09-26

## 2024-09-26 RX ORDER — HYDRALAZINE HYDROCHLORIDE 25 MG/1
25 TABLET, FILM COATED ORAL 3 TIMES DAILY
Qty: 90 TABLET | Refills: 3
Start: 2024-09-26

## 2024-09-30 RX ORDER — CLONIDINE HYDROCHLORIDE 0.1 MG/1
0.1 TABLET ORAL NIGHTLY
Qty: 90 TABLET | Refills: 3 | Status: SHIPPED | OUTPATIENT
Start: 2024-09-30

## 2024-09-30 NOTE — TELEPHONE ENCOUNTER
Patient requesting rx for Clonidine 0.1mg nightly. Rx e-prescribed to [facility] per patient request

## 2024-10-10 PROBLEM — Z00.00 INITIAL MEDICARE ANNUAL WELLNESS VISIT: Status: RESOLVED | Noted: 2024-09-10 | Resolved: 2024-10-10

## 2024-10-11 ENCOUNTER — TELEPHONE (OUTPATIENT)
Dept: CARDIOLOGY CLINIC | Age: 89
End: 2024-10-11

## 2024-10-11 DIAGNOSIS — D64.9 LOW HEMOGLOBIN: ICD-10-CM

## 2024-10-11 DIAGNOSIS — I10 PRIMARY HYPERTENSION: Primary | ICD-10-CM

## 2024-10-11 DIAGNOSIS — D69.6 THROMBOCYTOPENIA (HCC): ICD-10-CM

## 2024-10-11 LAB
BASOPHILS # BLD: 0 K/UL (ref 0–0.2)
BASOPHILS NFR BLD: 0.7 %
DEPRECATED RDW RBC AUTO: 17.1 % (ref 12.4–15.4)
EOSINOPHIL # BLD: 0 K/UL (ref 0–0.6)
EOSINOPHIL NFR BLD: 1.1 %
HCT VFR BLD AUTO: 31.9 % (ref 40.5–52.5)
HGB BLD-MCNC: 10.4 G/DL (ref 13.5–17.5)
LYMPHOCYTES # BLD: 1.4 K/UL (ref 1–5.1)
LYMPHOCYTES NFR BLD: 31.9 %
MCH RBC QN AUTO: 29 PG (ref 26–34)
MCHC RBC AUTO-ENTMCNC: 32.6 G/DL (ref 31–36)
MCV RBC AUTO: 88.8 FL (ref 80–100)
MONOCYTES # BLD: 0.3 K/UL (ref 0–1.3)
MONOCYTES NFR BLD: 7.9 %
NEUTROPHILS # BLD: 2.5 K/UL (ref 1.7–7.7)
NEUTROPHILS NFR BLD: 58.4 %
PLATELET # BLD AUTO: 86 K/UL (ref 135–450)
PMV BLD AUTO: 9.7 FL (ref 5–10.5)
RBC # BLD AUTO: 3.59 M/UL (ref 4.2–5.9)
WBC # BLD AUTO: 4.3 K/UL (ref 4–11)

## 2024-10-11 RX ORDER — FUROSEMIDE 20 MG
20 TABLET ORAL DAILY
Qty: 90 TABLET | Refills: 1 | Status: SHIPPED | OUTPATIENT
Start: 2024-10-11 | End: 2024-10-11

## 2024-10-11 RX ORDER — FUROSEMIDE 20 MG
20 TABLET ORAL DAILY
Qty: 30 TABLET | Refills: 0
Start: 2024-10-11

## 2024-10-11 NOTE — TELEPHONE ENCOUNTER
Mr Martínez's son, Elida, called asking if Gautam can take a diuretic. He states his dad's weight is up 8 lbs in the past 2 weeks, he is increasingly more short of breath and his lower legs are starting to swell. He states they appear glossy. He does not have any edema in his feet or ankles. Elida states his dad got up at 4am this morning due to SOB and slept in the recliner. He slept fine once in the recliner. VS from yesterday are 158/74 w/ HR 62 and 121/71 w/ HR 54.     Labs 9/13/24: BUN/creat 14/1.1, K+ 4.1

## 2024-10-11 NOTE — TELEPHONE ENCOUNTER
I spoke w/ Pat and provided instructions per MMK. He verbalized understanding. He would like rx for Lasix to go to [facility] and they will go later in the day on Monday to repeat BMP. He states no one is available to take him on Tuesday. I advised Pat to keep me updated on how his dad is doing and call with any questions/concerns. I will follow up with him early next week, as well.     Rx for Lasix 20mg 1 po daily #30 w/ 0RF [facility]

## 2024-10-14 ENCOUNTER — TELEPHONE (OUTPATIENT)
Dept: PRIMARY CARE CLINIC | Age: 89
End: 2024-10-14

## 2024-10-14 ENCOUNTER — OFFICE VISIT (OUTPATIENT)
Dept: PRIMARY CARE CLINIC | Age: 89
End: 2024-10-14
Payer: MEDICARE

## 2024-10-14 ENCOUNTER — HOSPITAL ENCOUNTER (OUTPATIENT)
Dept: GENERAL RADIOLOGY | Age: 89
Discharge: HOME | End: 2024-10-14
Payer: MEDICARE

## 2024-10-14 ENCOUNTER — HOSPITAL ENCOUNTER (OUTPATIENT)
Age: 89
Discharge: HOME | End: 2024-10-14
Payer: MEDICARE

## 2024-10-14 VITALS
BODY MASS INDEX: 23.54 KG/M2 | OXYGEN SATURATION: 98 % | RESPIRATION RATE: 18 BRPM | TEMPERATURE: 98.1 F | DIASTOLIC BLOOD PRESSURE: 60 MMHG | WEIGHT: 150 LBS | SYSTOLIC BLOOD PRESSURE: 128 MMHG | HEIGHT: 67 IN | HEART RATE: 71 BPM

## 2024-10-14 DIAGNOSIS — I42.9 CARDIOMYOPATHY, UNSPECIFIED TYPE (HCC): ICD-10-CM

## 2024-10-14 DIAGNOSIS — I50.20 HFREF (HEART FAILURE WITH REDUCED EJECTION FRACTION) (HCC): ICD-10-CM

## 2024-10-14 DIAGNOSIS — I10 PRIMARY HYPERTENSION: ICD-10-CM

## 2024-10-14 DIAGNOSIS — I42.9 CARDIOMYOPATHY, UNSPECIFIED TYPE (HCC): Primary | ICD-10-CM

## 2024-10-14 LAB
ANION GAP SERPL CALCULATED.3IONS-SCNC: 9 MMOL/L (ref 3–16)
BUN SERPL-MCNC: 16 MG/DL (ref 7–20)
CALCIUM SERPL-MCNC: 8.8 MG/DL (ref 8.3–10.6)
CHLORIDE SERPL-SCNC: 105 MMOL/L (ref 99–110)
CO2 SERPL-SCNC: 29 MMOL/L (ref 21–32)
CREAT SERPL-MCNC: 1.3 MG/DL (ref 0.8–1.3)
GFR SERPLBLD CREATININE-BSD FMLA CKD-EPI: 51 ML/MIN/{1.73_M2}
GLUCOSE SERPL-MCNC: 123 MG/DL (ref 70–99)
NT-PROBNP SERPL-MCNC: 3792 PG/ML (ref 0–449)
POTASSIUM SERPL-SCNC: 4.3 MMOL/L (ref 3.5–5.1)
SODIUM SERPL-SCNC: 143 MMOL/L (ref 136–145)

## 2024-10-14 PROCEDURE — G8420 CALC BMI NORM PARAMETERS: HCPCS | Performed by: INTERNAL MEDICINE

## 2024-10-14 PROCEDURE — 71046 X-RAY EXAM CHEST 2 VIEWS: CPT

## 2024-10-14 PROCEDURE — G8484 FLU IMMUNIZE NO ADMIN: HCPCS | Performed by: INTERNAL MEDICINE

## 2024-10-14 PROCEDURE — 1036F TOBACCO NON-USER: CPT | Performed by: INTERNAL MEDICINE

## 2024-10-14 PROCEDURE — 1123F ACP DISCUSS/DSCN MKR DOCD: CPT | Performed by: INTERNAL MEDICINE

## 2024-10-14 PROCEDURE — G8427 DOCREV CUR MEDS BY ELIG CLIN: HCPCS | Performed by: INTERNAL MEDICINE

## 2024-10-14 PROCEDURE — 99213 OFFICE O/P EST LOW 20 MIN: CPT | Performed by: INTERNAL MEDICINE

## 2024-10-14 ASSESSMENT — ENCOUNTER SYMPTOMS
COUGH: 0
ABDOMINAL PAIN: 0
VOMITING: 0
ABDOMINAL DISTENTION: 0
BLOOD IN STOOL: 0
BACK PAIN: 0
COLOR CHANGE: 0
TROUBLE SWALLOWING: 0
SORE THROAT: 0
SHORTNESS OF BREATH: 1
EYE REDNESS: 0
SINUS PRESSURE: 0
CHEST TIGHTNESS: 0
NAUSEA: 0
WHEEZING: 0
EYE PAIN: 0
DIARRHEA: 0
CONSTIPATION: 0

## 2024-10-14 NOTE — TELEPHONE ENCOUNTER
Patient has to be assessed by a physician he he could schedule an appointment or go to his nearest ER for further assessment

## 2024-10-14 NOTE — TELEPHONE ENCOUNTER
Patient is having trouble with his breathing. Pulse ox is normal in the 90's, lungs are clear - son is a medic and patient just saw Dr. Asencio - Cardiologist said lungs sounded good and everything was good with his heart.   Patient's daughter would like a chest x-ray to see if there is something more going on they can not hear but might show up in the x-ray.   Patient is not sleeping through the night, still complaining about trouble breathing.

## 2024-10-14 NOTE — ASSESSMENT & PLAN NOTE
Mildly Decompensated  Will get CXR and BMP.  Cardiologist ordered renal function  On carvedilol and furosemide.  Avoid salt in diet weigh self every morning.  Follow-up with cardiologist patient to go to the ER if he were to have symptoms including but not limited to  severe chest pain on exertion or rest, worsening paroxysmal nocturnal dyspnea, shortness of breath on exertion or at rest, palpitations, presyncope, diaphoresis, leg swelling.

## 2024-10-14 NOTE — ASSESSMENT & PLAN NOTE
Mildly Decompensated  Echo 9/27/2024 EF 40%  Will get CXR and BMP.  Cardiologist ordered renal function  On carvedilol and furosemide.  Avoid salt in diet weigh self every morning.  Follow-up with cardiologist patient to go to the ER if he were to have symptoms including but not limited to  severe chest pain on exertion or rest, worsening paroxysmal nocturnal dyspnea, shortness of breath on exertion or at rest, palpitations, presyncope, diaphoresis, leg swelling.

## 2024-10-14 NOTE — PROGRESS NOTES
Gautam Martínez is 89 y.o. male with a PMH of Cardiomyopathy who p/w Breathing Problem    Patient says he has been having shortness of breath which gets worse at night.  He has to get up and move around to catch his breath.  Laying down worsens his breathing.  This is associated with leg swelling.  He said he called his cardiologist office last week and was started on furosemide for the same.  Patient says despite taking medication he still has shortness of breath.  Patient denies productive cough, no fever/chills, no pleuritic chest pain, no dizziness.  Family denies confusion/mental status changes. Patient will like to know what the next step is and treatment options are.       The problem and medicine lists and chart were reviewed in detail.        Review of Systems   Constitutional:  Negative for activity change, appetite change, chills, fatigue, fever and unexpected weight change.   HENT:  Negative for congestion, ear pain, postnasal drip, sinus pressure, sore throat, tinnitus and trouble swallowing.    Eyes:  Negative for pain and redness.   Respiratory:  Positive for shortness of breath. Negative for cough, chest tightness and wheezing.    Cardiovascular:  Positive for leg swelling. Negative for chest pain and palpitations.   Gastrointestinal:  Negative for abdominal distention, abdominal pain, blood in stool, constipation, diarrhea, nausea and vomiting.   Endocrine: Negative for cold intolerance, heat intolerance and polydipsia.   Genitourinary:  Negative for decreased urine volume, dysuria, flank pain, frequency, hematuria and urgency.   Musculoskeletal:  Negative for arthralgias, back pain, joint swelling, neck pain and neck stiffness.   Skin:  Negative for color change and rash.   Neurological:  Negative for dizziness, weakness, numbness and headaches.   Hematological:  Negative for adenopathy.   Psychiatric/Behavioral:  Negative for behavioral problems, sleep disturbance and suicidal ideas. The patient

## 2024-10-15 DIAGNOSIS — J22 LRTI (LOWER RESPIRATORY TRACT INFECTION): Primary | ICD-10-CM

## 2024-10-15 RX ORDER — AZITHROMYCIN 250 MG/1
TABLET, FILM COATED ORAL
Qty: 6 TABLET | Refills: 0 | Status: SHIPPED | OUTPATIENT
Start: 2024-10-15 | End: 2024-10-25

## 2024-10-15 NOTE — RESULT ENCOUNTER NOTE
Please give patient results.  Will start treatment with azithromycin to cover an atypical infection.  If no improvement we will refer to pulmonology for further assessment.    His CHF - NT PRO BNP number was elevated - 3792, I would still like him to follow-up with his cardiologist to manage his diuretics.

## 2024-10-22 ENCOUNTER — TELEPHONE (OUTPATIENT)
Dept: CARDIOLOGY CLINIC | Age: 89
End: 2024-10-22

## 2024-10-22 DIAGNOSIS — I50.20 HFREF (HEART FAILURE WITH REDUCED EJECTION FRACTION) (HCC): Primary | ICD-10-CM

## 2024-10-22 NOTE — TELEPHONE ENCOUNTER
Pat called stating Gautam is feeling much better on Lasix 20mg daily. His weight is down to 143lbs. Pat thinks Gautam will need to stay on Lasix as his feet and legs are still slightly puffy. He states pcp advised them to reach out to cardiologist regarding repeating his chest x-ray at some point.

## 2024-10-22 NOTE — TELEPHONE ENCOUNTER
I spoke w/ Pat and relayed instructions. He verbalized understanding. He will take his dad to [facility] for labs and chest x-ray in 2 weeks.

## 2024-10-22 NOTE — TELEPHONE ENCOUNTER
Per Dr Asencio, repeat BNP and chest xray in 2 weeks. Orders placed.  LM w/ Pat to return my call to discuss.

## 2024-11-04 RX ORDER — ALLOPURINOL 100 MG/1
100 TABLET ORAL DAILY
Qty: 90 TABLET | Refills: 3 | Status: SHIPPED | OUTPATIENT
Start: 2024-11-04

## 2024-11-05 ENCOUNTER — HOSPITAL ENCOUNTER (OUTPATIENT)
Dept: GENERAL RADIOLOGY | Age: 89
Discharge: HOME | End: 2024-11-05
Payer: MEDICARE

## 2024-11-05 ENCOUNTER — HOSPITAL ENCOUNTER (OUTPATIENT)
Age: 89
Discharge: HOME | End: 2024-11-05
Payer: MEDICARE

## 2024-11-05 DIAGNOSIS — I50.20 HFREF (HEART FAILURE WITH REDUCED EJECTION FRACTION) (HCC): ICD-10-CM

## 2024-11-05 LAB — NT-PROBNP SERPL-MCNC: 2427 PG/ML (ref 0–449)

## 2024-11-05 PROCEDURE — 83880 ASSAY OF NATRIURETIC PEPTIDE: CPT

## 2024-11-05 PROCEDURE — 36415 COLL VENOUS BLD VENIPUNCTURE: CPT

## 2024-11-05 PROCEDURE — 71046 X-RAY EXAM CHEST 2 VIEWS: CPT

## 2024-11-06 ENCOUNTER — TELEPHONE (OUTPATIENT)
Dept: PRIMARY CARE CLINIC | Age: 88
End: 2024-11-06

## 2024-11-06 NOTE — TELEPHONE ENCOUNTER
----- Message from Dr. Jl Bear MD sent at 11/5/2024  6:34 PM EST -----  Regarding: Follow-up  Please call patient and find out how he is doing.  His CXR was negative for fluid overload.  His CHF lab work showed mild improvement compared to prior result.

## 2024-11-06 NOTE — TELEPHONE ENCOUNTER
I have tried calling the patient several times today, 11/6/2024,  and I keep getting the same message, \"The person you are trying to call can't take your call at this time. Goodbye.\"

## 2024-11-12 ENCOUNTER — TELEPHONE (OUTPATIENT)
Dept: CARDIOLOGY CLINIC | Age: 89
End: 2024-11-12

## 2024-11-12 RX ORDER — FUROSEMIDE 20 MG/1
20 TABLET ORAL EVERY OTHER DAY
Qty: 30 TABLET | Refills: 3 | Status: SHIPPED | OUTPATIENT
Start: 2024-11-12

## 2024-11-12 NOTE — TELEPHONE ENCOUNTER
----- Message from Dr. April Asencio MD sent at 11/12/2024  9:06 AM EST -----  Call Pat. Looks like Gautam needs the Lasix 20 every other day.

## 2024-12-04 RX ORDER — FUROSEMIDE 20 MG/1
20 TABLET ORAL EVERY OTHER DAY
Qty: 90 TABLET | Refills: 1 | Status: SHIPPED | OUTPATIENT
Start: 2024-12-04

## 2024-12-04 NOTE — TELEPHONE ENCOUNTER
[facility] pharmacy called stating since Lasix rx was sent to [facility], it cannot be filled at [facility] now without new rx called in. I gave verbal ok for Lasix 20mg QOD #30 w/ 0RF.

## 2024-12-04 NOTE — TELEPHONE ENCOUNTER
Patient's daughter in law states pt requesting refill for Lasix 20mg QOD be sent to [facility]. Rx sent.

## 2024-12-23 RX ORDER — HYDRALAZINE HYDROCHLORIDE 50 MG/1
50 TABLET, FILM COATED ORAL 3 TIMES DAILY
Qty: 270 TABLET | Refills: 3 | Status: SHIPPED | OUTPATIENT
Start: 2024-12-23

## 2024-12-23 NOTE — TELEPHONE ENCOUNTER
9/26/2024 OV with [facility]    1/29/2025 next OV with [facility]    I spoke with pt to verify his dose. He is taking Hydralazine 50 mg tid. I verified with Dr. Asencio that this is his correct dose.     Medication pending.

## 2025-01-02 ENCOUNTER — OFFICE VISIT (OUTPATIENT)
Dept: PRIMARY CARE CLINIC | Age: 89
End: 2025-01-02

## 2025-01-02 ENCOUNTER — HOSPITAL ENCOUNTER (OUTPATIENT)
Dept: GENERAL RADIOLOGY | Age: 89
Discharge: HOME | End: 2025-01-02
Payer: MEDICARE

## 2025-01-02 VITALS
BODY MASS INDEX: 22.82 KG/M2 | TEMPERATURE: 97.2 F | WEIGHT: 145.4 LBS | HEART RATE: 55 BPM | OXYGEN SATURATION: 99 % | HEIGHT: 67 IN | DIASTOLIC BLOOD PRESSURE: 62 MMHG | SYSTOLIC BLOOD PRESSURE: 124 MMHG | RESPIRATION RATE: 14 BRPM

## 2025-01-02 DIAGNOSIS — Z87.448 HISTORY OF ACUTE RENAL FAILURE: ICD-10-CM

## 2025-01-02 DIAGNOSIS — I42.9 CARDIOMYOPATHY, UNSPECIFIED TYPE (HCC): ICD-10-CM

## 2025-01-02 DIAGNOSIS — D69.6 THROMBOCYTOPENIA, UNSPECIFIED (HCC): ICD-10-CM

## 2025-01-02 DIAGNOSIS — I71.40 ABDOMINAL AORTIC ANEURYSM (AAA) WITHOUT RUPTURE, UNSPECIFIED PART (HCC): ICD-10-CM

## 2025-01-02 DIAGNOSIS — M25.551 RIGHT HIP PAIN: ICD-10-CM

## 2025-01-02 DIAGNOSIS — I10 PRIMARY HYPERTENSION: Primary | ICD-10-CM

## 2025-01-02 DIAGNOSIS — I50.20 HFREF (HEART FAILURE WITH REDUCED EJECTION FRACTION) (HCC): ICD-10-CM

## 2025-01-02 DIAGNOSIS — I10 PRIMARY HYPERTENSION: ICD-10-CM

## 2025-01-02 DIAGNOSIS — C44.209 CANCER OF SKIN OF AURICLE OF LEFT EAR: ICD-10-CM

## 2025-01-02 LAB
ALBUMIN SERPL-MCNC: 4.4 G/DL (ref 3.4–5)
ALBUMIN/GLOB SERPL: 1.9 {RATIO} (ref 1.1–2.2)
ALP SERPL-CCNC: 127 U/L (ref 40–129)
ALT SERPL-CCNC: 19 U/L (ref 10–40)
ANION GAP SERPL CALCULATED.3IONS-SCNC: 11 MMOL/L (ref 3–16)
AST SERPL-CCNC: 23 U/L (ref 15–37)
BILIRUB SERPL-MCNC: 0.5 MG/DL (ref 0–1)
BUN SERPL-MCNC: 26 MG/DL (ref 7–20)
CALCIUM SERPL-MCNC: 9.4 MG/DL (ref 8.3–10.6)
CHLORIDE SERPL-SCNC: 107 MMOL/L (ref 99–110)
CO2 SERPL-SCNC: 28 MMOL/L (ref 21–32)
CREAT SERPL-MCNC: 1.3 MG/DL (ref 0.8–1.3)
GFR SERPLBLD CREATININE-BSD FMLA CKD-EPI: 51 ML/MIN/{1.73_M2}
GLUCOSE SERPL-MCNC: 102 MG/DL (ref 70–99)
POTASSIUM SERPL-SCNC: 4.6 MMOL/L (ref 3.5–5.1)
PROT SERPL-MCNC: 6.7 G/DL (ref 6.4–8.2)
SODIUM SERPL-SCNC: 146 MMOL/L (ref 136–145)

## 2025-01-02 PROCEDURE — 73502 X-RAY EXAM HIP UNI 2-3 VIEWS: CPT

## 2025-01-02 ASSESSMENT — PATIENT HEALTH QUESTIONNAIRE - PHQ9
SUM OF ALL RESPONSES TO PHQ QUESTIONS 1-9: 1
2. FEELING DOWN, DEPRESSED OR HOPELESS: NOT AT ALL
SUM OF ALL RESPONSES TO PHQ9 QUESTIONS 1 & 2: 1
SUM OF ALL RESPONSES TO PHQ QUESTIONS 1-9: 1
1. LITTLE INTEREST OR PLEASURE IN DOING THINGS: SEVERAL DAYS

## 2025-01-02 ASSESSMENT — ENCOUNTER SYMPTOMS
COLOR CHANGE: 0
SORE THROAT: 0
BACK PAIN: 0
SINUS PRESSURE: 0
TROUBLE SWALLOWING: 0
WHEEZING: 0
COUGH: 0
DIARRHEA: 0
CONSTIPATION: 0
SHORTNESS OF BREATH: 0
ABDOMINAL PAIN: 0
EYE REDNESS: 0
NAUSEA: 0
ABDOMINAL DISTENTION: 0
EYE PAIN: 0
VOMITING: 0
BLOOD IN STOOL: 0
CHEST TIGHTNESS: 0

## 2025-01-02 NOTE — ASSESSMENT & PLAN NOTE
History of acute renal failure -patient was admitted to [facility] [facility] 6/29/2024 for acute urinary retention.  Patient developed postobstructive MIRTA.  Today patient is approaching baseline he says he has less discomfort when voiding urine since catheter has been ordered.  No other complaints or issues.

## 2025-01-02 NOTE — ASSESSMENT & PLAN NOTE
Appears compensated  On carvedilol and furosemide.  Avoid salt in diet weigh self every morning.  Follow-up with cardiologist patient to go to the ER if he were to have symptoms including but not limited to  severe chest pain on exertion or rest, worsening paroxysmal nocturnal dyspnea, shortness of breath on exertion or at rest, palpitations, presyncope, diaphoresis, leg swelling.

## 2025-01-02 NOTE — ASSESSMENT & PLAN NOTE
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 appears stable from previous exam  dated 5/20/2022.  Follow-up with specialist

## 2025-01-02 NOTE — ASSESSMENT & PLAN NOTE
BP stable  Continue anti-hypertensive medication as documented in your medication list   To verify blood pressure cuff accuracy  To keep outpatient BP log,  counseled on exercise and diet (including DASH diet)  Goal to achieve appropriate BMI  Patient agreed with plan with verbal understanding

## 2025-01-02 NOTE — PROGRESS NOTES
(2-3 VIEWS); Future    Preventative care discussed.  Encouraged healthy diet choices, reg exercise. Patient agreed w/plan and verbal understanding. Patient advised to call or return with any concerns or problems or worsening conditions. Go to the ER for any severe or potentially life threatening problems.    Return in about 4 months (around 5/2/2025) for Routine follow-up, 15-minutes .     This note was generated in part or in whole with voice recognition software.  Voice recognition is usually quite accurate but there are transcription errors that can often occur.  All attempts were made to correct these errors I apologized for any  typographical errors that were not detected and corrected.     Electronically signed by Jl Bear MD on 1/2/2025 at 1:29 PM.

## 2025-01-03 ENCOUNTER — TELEPHONE (OUTPATIENT)
Dept: PRIMARY CARE CLINIC | Age: 89
End: 2025-01-03

## 2025-01-07 ENCOUNTER — TELEPHONE (OUTPATIENT)
Dept: PRIMARY CARE CLINIC | Age: 89
End: 2025-01-07

## 2025-01-07 NOTE — TELEPHONE ENCOUNTER
----- Message from Dr. Jl Bear MD sent at 1/3/2025  8:23 AM EST -----  Please let patient know kidney function remains stable

## 2025-01-10 ENCOUNTER — OFFICE VISIT (OUTPATIENT)
Dept: ORTHOPEDIC SURGERY | Age: 89
End: 2025-01-10

## 2025-01-10 ENCOUNTER — TELEPHONE (OUTPATIENT)
Dept: ORTHOPEDIC SURGERY | Age: 89
End: 2025-01-10

## 2025-01-10 DIAGNOSIS — M16.11 PRIMARY OSTEOARTHRITIS OF RIGHT HIP: Primary | ICD-10-CM

## 2025-01-10 NOTE — TELEPHONE ENCOUNTER
General Question     Subject: APPOINTMENT MOVED  Patient and /or Facility Request: Gautam Martínez \"Sameer\"   Contact Number:  [phone]     PATIENT'S SON CALLED STATED THAT HE RECEIVED A PHONE CALL TO MOVE HIS DAD'S APPOINTMENT.    CL STATED THAT HIS WIFE WILL HAVE HIM AT THE OFFICE AT 10:00.    IF THERE'S A PROBLEM WITH THAT TIME, PLEASE CALL PATIENT AT THE ABOVE NUMBER.

## 2025-01-17 NOTE — PROGRESS NOTES
[facility]   Cardiac Evaluation      Patient: Gautam Martínez  YOB: 1931  Date: 1/29/25     CC: cmp , AR and HTN     Referring provider: Jl Bear MD    History of Present Illness:   Mr. Martínez comes to the office today for follow up. His history includes nonischemic cardiomyopathy, mild valvular disease, hypertension, and hyperlipidemia.  AAA 4.0cm on screening 12/18/20.      Mr. Martínez is here with his daughter in law and wife. 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 He was bradycardic in hospital. Losartan and HCTZ were discontinued. He had to return to the hospital for urinary retention due to an enlarged prostate and is now on Proscar and following with urology.      Mr Martínez is here with his wife and son. He denies chest pain, palpitations, BALL, dizziness, or edema. He continues to monitor b/p and HR's regularly.     Past Medical History:   has a past medical history of AAA (abdominal aortic aneurysm) (HCC), Arthritis, Blind right eye, Cancer (HCC), Cardiomyopathy (HCC), Gastric ulcer, Glaucoma, Gout, Hyperlipidemia, Hypertension, and PVD (peripheral vascular disease) (HCC).    Surgical History:   has a past surgical history that includes Diagnostic Cardiac Cath Lab Procedure; hernia repair; External ear surgery (Left, 08/31/2016); Knee arthroscopy; Vasectomy; Inguinal hernia repair (Right, 07/23/2018); and pre-malignant / benign skin lesion excision (N/A, 2/11/2020).     Social History:   reports that he quit smoking about 68 years ago. His smoking use included cigarettes. He has never used smokeless tobacco. He reports that he does not drink alcohol and does not use drugs.     Family History:  Son with AR      Current Outpatient Medications on File Prior to Visit   Medication Sig Dispense Refill    hydrALAZINE (APRESOLINE) 50 MG tablet Take 1 tablet by mouth 3 times daily 270 tablet 3    furosemide

## 2025-01-29 ENCOUNTER — OFFICE VISIT (OUTPATIENT)
Dept: CARDIOLOGY CLINIC | Age: 89
End: 2025-01-29

## 2025-01-29 VITALS
HEART RATE: 55 BPM | HEIGHT: 67 IN | BODY MASS INDEX: 23.07 KG/M2 | DIASTOLIC BLOOD PRESSURE: 70 MMHG | OXYGEN SATURATION: 98 % | WEIGHT: 147 LBS | SYSTOLIC BLOOD PRESSURE: 158 MMHG

## 2025-01-29 DIAGNOSIS — E78.2 MIXED HYPERLIPIDEMIA: ICD-10-CM

## 2025-01-29 DIAGNOSIS — I50.20 HFREF (HEART FAILURE WITH REDUCED EJECTION FRACTION) (HCC): ICD-10-CM

## 2025-01-29 DIAGNOSIS — I42.9 CARDIOMYOPATHY, UNSPECIFIED TYPE (HCC): ICD-10-CM

## 2025-01-29 DIAGNOSIS — I10 PRIMARY HYPERTENSION: Primary | ICD-10-CM

## 2025-03-28 ENCOUNTER — TELEPHONE (OUTPATIENT)
Dept: CARDIOLOGY CLINIC | Age: 89
End: 2025-03-28

## 2025-03-28 RX ORDER — CLONIDINE HYDROCHLORIDE 0.1 MG/1
0.1 TABLET ORAL 2 TIMES DAILY
Qty: 180 TABLET | Refills: 3 | Status: SHIPPED | OUTPATIENT
Start: 2025-03-28

## 2025-03-28 NOTE — TELEPHONE ENCOUNTER
Elida, patient's son, called stating Mr Martínez's b/p has been running high. Dr Asencio spoke w/ Pat and is increasing Clonidine to 0.1mg BID. An rx has been sent to [facility].

## 2025-04-29 SDOH — ECONOMIC STABILITY: FOOD INSECURITY: WITHIN THE PAST 12 MONTHS, YOU WORRIED THAT YOUR FOOD WOULD RUN OUT BEFORE YOU GOT MONEY TO BUY MORE.: NEVER TRUE

## 2025-04-29 SDOH — ECONOMIC STABILITY: FOOD INSECURITY: WITHIN THE PAST 12 MONTHS, THE FOOD YOU BOUGHT JUST DIDN'T LAST AND YOU DIDN'T HAVE MONEY TO GET MORE.: NEVER TRUE

## 2025-04-29 SDOH — ECONOMIC STABILITY: INCOME INSECURITY: IN THE LAST 12 MONTHS, WAS THERE A TIME WHEN YOU WERE NOT ABLE TO PAY THE MORTGAGE OR RENT ON TIME?: NO

## 2025-04-29 SDOH — ECONOMIC STABILITY: TRANSPORTATION INSECURITY
IN THE PAST 12 MONTHS, HAS LACK OF TRANSPORTATION KEPT YOU FROM MEETINGS, WORK, OR FROM GETTING THINGS NEEDED FOR DAILY LIVING?: NO

## 2025-04-29 SDOH — ECONOMIC STABILITY: TRANSPORTATION INSECURITY
IN THE PAST 12 MONTHS, HAS THE LACK OF TRANSPORTATION KEPT YOU FROM MEDICAL APPOINTMENTS OR FROM GETTING MEDICATIONS?: NO

## 2025-05-02 ENCOUNTER — OFFICE VISIT (OUTPATIENT)
Dept: PRIMARY CARE CLINIC | Age: 89
End: 2025-05-02

## 2025-05-02 VITALS
DIASTOLIC BLOOD PRESSURE: 70 MMHG | TEMPERATURE: 98 F | OXYGEN SATURATION: 99 % | BODY MASS INDEX: 24.01 KG/M2 | SYSTOLIC BLOOD PRESSURE: 130 MMHG | RESPIRATION RATE: 16 BRPM | WEIGHT: 153 LBS | HEIGHT: 67 IN | HEART RATE: 57 BPM

## 2025-05-02 DIAGNOSIS — C44.209 CANCER OF SKIN OF AURICLE OF LEFT EAR: ICD-10-CM

## 2025-05-02 DIAGNOSIS — D64.9 LOW HEMOGLOBIN: ICD-10-CM

## 2025-05-02 DIAGNOSIS — L89.309 PRESSURE INJURY OF SKIN OF BUTTOCK, UNSPECIFIED INJURY STAGE, UNSPECIFIED LATERALITY: ICD-10-CM

## 2025-05-02 DIAGNOSIS — I50.20 HFREF (HEART FAILURE WITH REDUCED EJECTION FRACTION) (HCC): ICD-10-CM

## 2025-05-02 DIAGNOSIS — I71.43 INFRARENAL ABDOMINAL AORTIC ANEURYSM (AAA) WITHOUT RUPTURE: ICD-10-CM

## 2025-05-02 DIAGNOSIS — N40.0 BENIGN PROSTATIC HYPERPLASIA WITHOUT LOWER URINARY TRACT SYMPTOMS: ICD-10-CM

## 2025-05-02 DIAGNOSIS — R53.83 OTHER FATIGUE: ICD-10-CM

## 2025-05-02 DIAGNOSIS — E78.2 MIXED HYPERLIPIDEMIA: ICD-10-CM

## 2025-05-02 DIAGNOSIS — I42.9 CARDIOMYOPATHY, UNSPECIFIED TYPE (HCC): ICD-10-CM

## 2025-05-02 DIAGNOSIS — I10 PRIMARY HYPERTENSION: ICD-10-CM

## 2025-05-02 DIAGNOSIS — I50.20 HFREF (HEART FAILURE WITH REDUCED EJECTION FRACTION) (HCC): Primary | ICD-10-CM

## 2025-05-02 LAB
ALBUMIN SERPL-MCNC: 4.2 G/DL (ref 3.4–5)
ALBUMIN/GLOB SERPL: 1.8 {RATIO} (ref 1.1–2.2)
ALP SERPL-CCNC: 129 U/L (ref 40–129)
ALT SERPL-CCNC: 16 U/L (ref 10–40)
ANION GAP SERPL CALCULATED.3IONS-SCNC: 9 MMOL/L (ref 3–16)
AST SERPL-CCNC: 23 U/L (ref 15–37)
BILIRUB SERPL-MCNC: 0.5 MG/DL (ref 0–1)
BUN SERPL-MCNC: 22 MG/DL (ref 7–20)
CALCIUM SERPL-MCNC: 9.3 MG/DL (ref 8.3–10.6)
CHLORIDE SERPL-SCNC: 106 MMOL/L (ref 99–110)
CO2 SERPL-SCNC: 29 MMOL/L (ref 21–32)
CREAT SERPL-MCNC: 1.3 MG/DL (ref 0.8–1.3)
FERRITIN SERPL IA-MCNC: 404 NG/ML (ref 30–400)
GFR SERPLBLD CREATININE-BSD FMLA CKD-EPI: 51 ML/MIN/{1.73_M2}
GLUCOSE SERPL-MCNC: 92 MG/DL (ref 70–99)
IRON SATN MFR SERPL: 24 % (ref 20–50)
IRON SERPL-MCNC: 51 UG/DL (ref 59–158)
POTASSIUM SERPL-SCNC: 4.5 MMOL/L (ref 3.5–5.1)
PROT SERPL-MCNC: 6.6 G/DL (ref 6.4–8.2)
SODIUM SERPL-SCNC: 144 MMOL/L (ref 136–145)
TIBC SERPL-MCNC: 209 UG/DL (ref 260–445)
TSH SERPL DL<=0.005 MIU/L-ACNC: 1.87 UIU/ML (ref 0.27–4.2)

## 2025-05-02 SDOH — ECONOMIC STABILITY: FOOD INSECURITY: WITHIN THE PAST 12 MONTHS, YOU WORRIED THAT YOUR FOOD WOULD RUN OUT BEFORE YOU GOT MONEY TO BUY MORE.: NEVER TRUE

## 2025-05-02 SDOH — ECONOMIC STABILITY: FOOD INSECURITY: WITHIN THE PAST 12 MONTHS, THE FOOD YOU BOUGHT JUST DIDN'T LAST AND YOU DIDN'T HAVE MONEY TO GET MORE.: NEVER TRUE

## 2025-05-02 ASSESSMENT — ENCOUNTER SYMPTOMS
COLOR CHANGE: 0
ABDOMINAL PAIN: 0
ABDOMINAL DISTENTION: 0
SORE THROAT: 0
NAUSEA: 0
DIARRHEA: 0
BLOOD IN STOOL: 0
SHORTNESS OF BREATH: 0
CONSTIPATION: 0
TROUBLE SWALLOWING: 0
EYE PAIN: 0
BACK PAIN: 0
CHEST TIGHTNESS: 0
SINUS PRESSURE: 0
COUGH: 0
VOMITING: 0
WHEEZING: 0
EYE REDNESS: 0

## 2025-05-02 ASSESSMENT — PATIENT HEALTH QUESTIONNAIRE - PHQ9
SUM OF ALL RESPONSES TO PHQ QUESTIONS 1-9: 0
SUM OF ALL RESPONSES TO PHQ QUESTIONS 1-9: 0
2. FEELING DOWN, DEPRESSED OR HOPELESS: NOT AT ALL
1. LITTLE INTEREST OR PLEASURE IN DOING THINGS: NOT AT ALL
SUM OF ALL RESPONSES TO PHQ QUESTIONS 1-9: 0
SUM OF ALL RESPONSES TO PHQ QUESTIONS 1-9: 0

## 2025-05-02 NOTE — PROGRESS NOTES
conditions. Go to the ER for any severe or potentially life threatening problems.    Return in about 19 weeks (around 9/12/2025) for Medicare AWV.     This note was generated in part or in whole with voice recognition software.  Voice recognition is usually quite accurate but there are transcription errors that can often occur.  All attempts were made to correct these errors I apologized for any  typographical errors that were not detected and corrected.     Electronically signed by Jl Bear MD on 5/2/2025 at 11:39 AM.

## 2025-05-03 LAB
BASOPHILS # BLD: 0.1 K/UL (ref 0–0.2)
BASOPHILS NFR BLD: 0.9 %
DEPRECATED RDW RBC AUTO: 15.4 % (ref 12.4–15.4)
EOSINOPHIL # BLD: 0.1 K/UL (ref 0–0.6)
EOSINOPHIL NFR BLD: 1.4 %
HCT VFR BLD AUTO: 33.7 % (ref 40.5–52.5)
HGB BLD-MCNC: 11.6 G/DL (ref 13.5–17.5)
LYMPHOCYTES # BLD: 1.5 K/UL (ref 1–5.1)
LYMPHOCYTES NFR BLD: 26.1 %
MCH RBC QN AUTO: 31.1 PG (ref 26–34)
MCHC RBC AUTO-ENTMCNC: 34.6 G/DL (ref 31–36)
MCV RBC AUTO: 89.9 FL (ref 80–100)
MONOCYTES # BLD: 0.6 K/UL (ref 0–1.3)
MONOCYTES NFR BLD: 9.8 %
NEUTROPHILS # BLD: 3.5 K/UL (ref 1.7–7.7)
NEUTROPHILS NFR BLD: 61.8 %
PLATELET # BLD AUTO: 91 K/UL (ref 135–450)
PMV BLD AUTO: 8.5 FL (ref 5–10.5)
RBC # BLD AUTO: 3.75 M/UL (ref 4.2–5.9)
WBC # BLD AUTO: 5.7 K/UL (ref 4–11)

## 2025-05-05 ENCOUNTER — RESULTS FOLLOW-UP (OUTPATIENT)
Dept: PRIMARY CARE CLINIC | Age: 89
End: 2025-05-05

## 2025-05-05 NOTE — RESULT ENCOUNTER NOTE
Please let patient know his hemoglobin has improved.  His kidney function remains stable.  His thyroid numbers are stable.

## 2025-05-05 NOTE — TELEPHONE ENCOUNTER
----- Message from Dr. Jl Bear MD sent at 5/5/2025 12:38 PM EDT -----  Please let patient know his hemoglobin has improved.  His kidney function remains stable.  His thyroid numbers are stable.

## 2025-05-27 NOTE — PROGRESS NOTES
[facility]   Cardiac Evaluation      Patient: Gautam Martínez  YOB: 1931  Date: 5/29/25     CC: cmp , AR and HTN     Referring provider: Jl Bear MD    History of Present Illness:   Mr. Martínez comes to the office today for follow up. His history includes nonischemic cardiomyopathy, mild valvular disease, hypertension, and hyperlipidemia.  AAA 4.0cm on screening 12/18/20.      Mr. Martínez is here with his daughter in law and wife. 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 He was bradycardic in hospital. Losartan and HCTZ were discontinued. He had to return to the hospital for urinary retention due to an enlarged prostate and is now on Proscar and following with urology.      Mr Martínez is here with his daughter in law and great grand daughters. Gautam did not bring blood pressure readings with him, but states his b/p is running high at night in the 150's. He denies chest pain, palpitations, dizziness, or edema.     Past Medical History:   has a past medical history of AAA (abdominal aortic aneurysm), Arthritis, Blind right eye, Cancer (HCC), Cardiomyopathy (HCC), Gastric ulcer, Glaucoma, Gout, Hyperlipidemia, Hypertension, and PVD (peripheral vascular disease).    Surgical History:   has a past surgical history that includes Diagnostic Cardiac Cath Lab Procedure; hernia repair; External ear surgery (Left, 08/31/2016); Knee arthroscopy; Vasectomy; Inguinal hernia repair (Right, 07/23/2018); and pre-malignant / benign skin lesion excision (N/A, 2/11/2020).     Social History:   reports that he quit smoking about 68 years ago. His smoking use included cigarettes. He has never used smokeless tobacco. He reports that he does not drink alcohol and does not use drugs.     Family History:  Son with AR      Current Outpatient Medications on File Prior to Visit   Medication Sig Dispense Refill    cloNIDine (CATAPRES) 0.1 MG

## 2025-05-29 ENCOUNTER — OFFICE VISIT (OUTPATIENT)
Dept: CARDIOLOGY CLINIC | Age: 89
End: 2025-05-29
Payer: MEDICARE

## 2025-05-29 VITALS
HEIGHT: 67 IN | SYSTOLIC BLOOD PRESSURE: 146 MMHG | HEART RATE: 60 BPM | WEIGHT: 153 LBS | OXYGEN SATURATION: 96 % | DIASTOLIC BLOOD PRESSURE: 66 MMHG | BODY MASS INDEX: 24.01 KG/M2

## 2025-05-29 DIAGNOSIS — I10 PRIMARY HYPERTENSION: ICD-10-CM

## 2025-05-29 DIAGNOSIS — I42.9 CARDIOMYOPATHY, UNSPECIFIED TYPE (HCC): Primary | ICD-10-CM

## 2025-05-29 DIAGNOSIS — E78.2 MIXED HYPERLIPIDEMIA: ICD-10-CM

## 2025-05-29 PROCEDURE — 1036F TOBACCO NON-USER: CPT | Performed by: INTERNAL MEDICINE

## 2025-05-29 PROCEDURE — G8427 DOCREV CUR MEDS BY ELIG CLIN: HCPCS | Performed by: INTERNAL MEDICINE

## 2025-05-29 PROCEDURE — 1123F ACP DISCUSS/DSCN MKR DOCD: CPT | Performed by: INTERNAL MEDICINE

## 2025-05-29 PROCEDURE — 1159F MED LIST DOCD IN RCRD: CPT | Performed by: INTERNAL MEDICINE

## 2025-05-29 PROCEDURE — G8420 CALC BMI NORM PARAMETERS: HCPCS | Performed by: INTERNAL MEDICINE

## 2025-05-29 PROCEDURE — 99214 OFFICE O/P EST MOD 30 MIN: CPT | Performed by: INTERNAL MEDICINE

## 2025-05-29 RX ORDER — HYDRALAZINE HYDROCHLORIDE 100 MG/1
100 TABLET, FILM COATED ORAL 3 TIMES DAILY
Qty: 270 TABLET | Refills: 3 | Status: SHIPPED | OUTPATIENT
Start: 2025-05-29 | End: 2025-06-03

## 2025-05-29 RX ORDER — CLONIDINE HYDROCHLORIDE 0.1 MG/1
TABLET ORAL
Qty: 180 TABLET | Refills: 3
Start: 2025-05-29

## 2025-06-03 ENCOUNTER — TELEPHONE (OUTPATIENT)
Dept: CARDIOLOGY CLINIC | Age: 89
End: 2025-06-03

## 2025-06-03 RX ORDER — HYDRALAZINE HYDROCHLORIDE 50 MG/1
75 TABLET, FILM COATED ORAL 3 TIMES DAILY
Qty: 1 TABLET | Refills: 1
Start: 2025-06-03

## 2025-06-03 NOTE — TELEPHONE ENCOUNTER
Patient's son, Elida, called to let Dr Asencio know that patient's b/p was too low with Hydralazine 100mg TID. They decreased the dose to 75mg TID. Dr Asencio is aware. They will continue to monitor b/p

## 2025-07-03 RX ORDER — CARVEDILOL 25 MG/1
25 TABLET ORAL 2 TIMES DAILY
Qty: 180 TABLET | Refills: 3 | Status: SHIPPED | OUTPATIENT
Start: 2025-07-03

## 2025-07-03 RX ORDER — FUROSEMIDE 20 MG/1
20 TABLET ORAL EVERY OTHER DAY
Qty: 45 TABLET | Refills: 3 | Status: SHIPPED | OUTPATIENT
Start: 2025-07-03

## 2025-07-03 NOTE — TELEPHONE ENCOUNTER
5/29/2025 OV with [facility]    11/25/2025 next OV     5/2/2025 labs done BUN 22/CR 1.3    3/20/2024 EKG

## 2025-07-15 ENCOUNTER — OFFICE VISIT (OUTPATIENT)
Dept: CARDIOLOGY CLINIC | Age: 89
End: 2025-07-15
Payer: MEDICARE

## 2025-07-15 VITALS — HEART RATE: 80 BPM | SYSTOLIC BLOOD PRESSURE: 140 MMHG | DIASTOLIC BLOOD PRESSURE: 80 MMHG

## 2025-07-15 DIAGNOSIS — I48.0 PAROXYSMAL ATRIAL FIBRILLATION (HCC): Primary | ICD-10-CM

## 2025-07-15 DIAGNOSIS — I42.9 CARDIOMYOPATHY, UNSPECIFIED TYPE (HCC): ICD-10-CM

## 2025-07-15 DIAGNOSIS — R06.02 SHORTNESS OF BREATH: ICD-10-CM

## 2025-07-15 DIAGNOSIS — E78.2 MIXED HYPERLIPIDEMIA: ICD-10-CM

## 2025-07-15 DIAGNOSIS — I10 PRIMARY HYPERTENSION: ICD-10-CM

## 2025-07-15 PROCEDURE — 1159F MED LIST DOCD IN RCRD: CPT | Performed by: INTERNAL MEDICINE

## 2025-07-15 PROCEDURE — G2211 COMPLEX E/M VISIT ADD ON: HCPCS | Performed by: INTERNAL MEDICINE

## 2025-07-15 PROCEDURE — 1036F TOBACCO NON-USER: CPT | Performed by: INTERNAL MEDICINE

## 2025-07-15 PROCEDURE — 99214 OFFICE O/P EST MOD 30 MIN: CPT | Performed by: INTERNAL MEDICINE

## 2025-07-15 PROCEDURE — G8427 DOCREV CUR MEDS BY ELIG CLIN: HCPCS | Performed by: INTERNAL MEDICINE

## 2025-07-15 PROCEDURE — G8420 CALC BMI NORM PARAMETERS: HCPCS | Performed by: INTERNAL MEDICINE

## 2025-07-15 PROCEDURE — 1123F ACP DISCUSS/DSCN MKR DOCD: CPT | Performed by: INTERNAL MEDICINE

## 2025-07-15 RX ORDER — FUROSEMIDE 20 MG/1
20 TABLET ORAL DAILY
Qty: 90 TABLET | Refills: 3 | Status: SHIPPED | OUTPATIENT
Start: 2025-07-15

## 2025-07-15 NOTE — PROGRESS NOTES
increased wall thickness. Moderate basal septal thickening. Dyskinesis of the following segments: mid to distal anteroseptal and apical septal. Grade II diastolic dysfunction with increased LAP. E/A ratio is 1.09. Average E/e' ratio is 20.66. mild AR w/ multiple jets. No stenosis. Moderate MR w/ multiple jets. RVSP 37mmHg.   Aorta: Normal sized aortic root and ascending aorta. Ao root diameter is 3.6 cm. Ao ascending diameter is 3.5 cm. Small pericardial effusion present    Echo 5/20/22: There is asymmetric hypertrophy of the basal septum. Overall left ventricular systolic function appears borderline normal with EF 50-55% range. Abnormal mid to distal septal motion is present  Diastolic filling parameters suggest grade I diastolic dysfunction.E/e\"=20.1. Mitral annular calcification is present. Mild mitral regurgitation. The aortic valve is mildly thickened/calcified but opens well with normal gradients. Mild aortic regurgitation.Trivial tricuspid regurgitation.Trivial pulmonic regurgitation present. Estimated pulmonary artery systolic pressure is normal at 25 mmHg assuming a right atrial pressure of 3 mmHg.  Echo 6/14/19: asymmetric hypertrophy of the septum near to LV outflow tract. Overall left ventricular systolic function appears borderline with EF 50-55% range. 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 Grade I diastolic dysfunction with normal LV filling pressures.E/e\"=12.5. Trivial mitral regurgitation. Aortic valve appears sclerotic but opens adequately. Mild aortic regurgitation. Trivial tricuspid regurgitation. Mild pulmonic regurgitation present. Estimated pulmonary artery systolic pressure is normal at 25-30 mmHg assuming a right atrial pressure of 3 mmHg  Echo 2/14> EF 35%. moderate global hypokinesis. reversal of E/A inflow velocities across the mitral valve suggesting impaired left ventricular relaxation. aortic valve is mildly calcified, opens well with normal gradients.

## 2025-07-16 LAB
CRP SERPL-MCNC: 14 MG/L (ref 0–5.1)
ERYTHROCYTE [SEDIMENTATION RATE] IN BLOOD BY WESTERGREN METHOD: 8 MM/HR (ref 0–20)

## 2025-07-23 NOTE — PROGRESS NOTES
[facility]   Cardiac Evaluation      Patient: Gautam Martínez  YOB: 1931  Date: 7/29/25     CC: cmp , AR and HTN     Referring provider: Jl Bear MD    History of Present Illness:   Mr. Martínez comes to the office today for follow up to new onset atrial fibrillation. His history includes nonischemic cardiomyopathy, mild valvular disease, hypertension, and hyperlipidemia.  AAA 4.0cm on screening 12/18/20.      Mr. Martínez is here with his daughter in law and wife. 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 He was bradycardic in hospital. Losartan and HCTZ were discontinued. He had to return to the hospital for urinary retention due to an enlarged prostate and is now on Proscar and following with urology.      Mr Martínez is here with his daughter in law. He was started on Eliquis 5mg BID and advised to increase Lasix 20mg to daily at LOV.  He states he feels short of breath occasionally. He has a cough and is congested. Gautam states he walks for exercise. He states he \"feels pretty good\". Gautam denies chest pain, palpitations, dizziness, or edema.     Past Medical History:   has a past medical history of AAA (abdominal aortic aneurysm), Arthritis, Blind right eye, Cancer (HCC), Cardiomyopathy (HCC), Gastric ulcer, Glaucoma, Gout, Hyperlipidemia, Hypertension, and PVD (peripheral vascular disease).    Surgical History:   has a past surgical history that includes Diagnostic Cardiac Cath Lab Procedure; hernia repair; External ear surgery (Left, 08/31/2016); Knee arthroscopy; Vasectomy; Inguinal hernia repair (Right, 07/23/2018); and pre-malignant / benign skin lesion excision (N/A, 2/11/2020).     Social History:   reports that he quit smoking about 68 years ago. His smoking use included cigarettes. He has been exposed to tobacco smoke. He has never used smokeless tobacco. He reports that he does not drink alcohol and

## 2025-07-29 ENCOUNTER — OFFICE VISIT (OUTPATIENT)
Dept: CARDIOLOGY CLINIC | Age: 89
End: 2025-07-29
Payer: MEDICARE

## 2025-07-29 VITALS
OXYGEN SATURATION: 97 % | WEIGHT: 161 LBS | SYSTOLIC BLOOD PRESSURE: 132 MMHG | DIASTOLIC BLOOD PRESSURE: 60 MMHG | HEIGHT: 67 IN | HEART RATE: 60 BPM | BODY MASS INDEX: 25.27 KG/M2

## 2025-07-29 DIAGNOSIS — I10 PRIMARY HYPERTENSION: ICD-10-CM

## 2025-07-29 DIAGNOSIS — R06.02 SHORTNESS OF BREATH: ICD-10-CM

## 2025-07-29 DIAGNOSIS — I42.9 CARDIOMYOPATHY, UNSPECIFIED TYPE (HCC): ICD-10-CM

## 2025-07-29 DIAGNOSIS — I48.0 PAROXYSMAL ATRIAL FIBRILLATION (HCC): Primary | ICD-10-CM

## 2025-07-29 DIAGNOSIS — E78.2 MIXED HYPERLIPIDEMIA: ICD-10-CM

## 2025-07-29 LAB
ANION GAP SERPL CALCULATED.3IONS-SCNC: 10 MMOL/L (ref 3–16)
BUN SERPL-MCNC: 25 MG/DL (ref 7–20)
CALCIUM SERPL-MCNC: 9 MG/DL (ref 8.3–10.6)
CHLORIDE SERPL-SCNC: 104 MMOL/L (ref 99–110)
CO2 SERPL-SCNC: 30 MMOL/L (ref 21–32)
CREAT SERPL-MCNC: 1.6 MG/DL (ref 0.8–1.3)
GFR SERPLBLD CREATININE-BSD FMLA CKD-EPI: 40 ML/MIN/{1.73_M2}
GLUCOSE SERPL-MCNC: 104 MG/DL (ref 70–99)
NT-PROBNP SERPL-MCNC: 3379 PG/ML (ref 0–449)
POTASSIUM SERPL-SCNC: 3.9 MMOL/L (ref 3.5–5.1)
SODIUM SERPL-SCNC: 144 MMOL/L (ref 136–145)

## 2025-07-29 PROCEDURE — G8427 DOCREV CUR MEDS BY ELIG CLIN: HCPCS | Performed by: INTERNAL MEDICINE

## 2025-07-29 PROCEDURE — 1123F ACP DISCUSS/DSCN MKR DOCD: CPT | Performed by: INTERNAL MEDICINE

## 2025-07-29 PROCEDURE — 1036F TOBACCO NON-USER: CPT | Performed by: INTERNAL MEDICINE

## 2025-07-29 PROCEDURE — 99214 OFFICE O/P EST MOD 30 MIN: CPT | Performed by: INTERNAL MEDICINE

## 2025-07-29 PROCEDURE — G2211 COMPLEX E/M VISIT ADD ON: HCPCS | Performed by: INTERNAL MEDICINE

## 2025-07-29 PROCEDURE — G8419 CALC BMI OUT NRM PARAM NOF/U: HCPCS | Performed by: INTERNAL MEDICINE

## 2025-07-29 PROCEDURE — 1159F MED LIST DOCD IN RCRD: CPT | Performed by: INTERNAL MEDICINE

## 2025-07-29 PROCEDURE — 93000 ELECTROCARDIOGRAM COMPLETE: CPT | Performed by: INTERNAL MEDICINE

## 2025-07-29 RX ORDER — NETARSUDIL 0.2 MG/ML
1 SOLUTION/ DROPS OPHTHALMIC; TOPICAL EVERY EVENING
COMMUNITY
Start: 2025-03-18

## 2025-07-31 ENCOUNTER — CARE COORDINATION (OUTPATIENT)
Dept: CARE COORDINATION | Age: 89
End: 2025-07-31

## 2025-07-31 NOTE — CARE COORDINATION
Ambulatory Care Coordination Note     2025 9:30 AM     Patient Current Location:  Home: [address] [place] [address] [place] [address]     This patient was received as a referral from [facility] health report .    ACM contacted the patient by telephone. Verified name and  with family as identifiers. Provided introduction to self, and explanation of the ACM role.   Family declined care management services at this time.          ACM: Dotty Hsu RN     Challenges to be reviewed by the provider   Additional needs identified to be addressed with provider No                 Method of communication with provider: none.    Utilization: Initial Call - N/A    Care Summary Note: ACM attempted to outreach patient, spoke to son Almas who advised that he manages his father's care.  Almas reported that they were just at the cardiologist and informed that patient is in AFIB, no plan to cardiovert at this time, will continue the Eliquis.  Family reported that BP is checked BID and SBP typically runs between 112-13.  Family advised that they have all the support they needs and declined need for further CM support.  Patient has not c/o denied cp, sob, cough, dizziness, headache, n/v, diarrhea, abdominal pains, fever, or chills.  Family reports that patient is taking all medications as ordered. Family denied any other needs at this time. Family instructed to continue to monitor s/s, reporting any that may present to MD immediately for early intervention. Educated on the use of urgent care or physician’s 24 hr access line if assistance is needed after hours.         PCP/Specialist follow up:   Future Appointments         Provider Specialty Dept Phone    2025 11:00 AM Jl Bear MD Primary Care [phone]    2025 3:15 PM April Asencio MD Cardiology [phone]            Follow Up:   No further Ambulatory Care Management follow-up scheduled at this time.  Caregiver has Ambulatory Care Manager's

## 2025-08-02 DIAGNOSIS — N18.32 CKD STAGE 3B, GFR 30-44 ML/MIN (HCC): Primary | ICD-10-CM

## 2025-08-04 ENCOUNTER — TELEPHONE (OUTPATIENT)
Dept: PRIMARY CARE CLINIC | Age: 89
End: 2025-08-04

## 2025-08-15 DIAGNOSIS — N18.32 CKD STAGE 3B, GFR 30-44 ML/MIN (HCC): ICD-10-CM

## 2025-08-15 LAB
ALBUMIN SERPL-MCNC: 4.2 G/DL (ref 3.4–5)
ANION GAP SERPL CALCULATED.3IONS-SCNC: 12 MMOL/L (ref 3–16)
BUN SERPL-MCNC: 17 MG/DL (ref 7–20)
CALCIUM SERPL-MCNC: 9 MG/DL (ref 8.3–10.6)
CHLORIDE SERPL-SCNC: 105 MMOL/L (ref 99–110)
CO2 SERPL-SCNC: 27 MMOL/L (ref 21–32)
CREAT SERPL-MCNC: 1.4 MG/DL (ref 0.8–1.3)
GFR SERPLBLD CREATININE-BSD FMLA CKD-EPI: 47 ML/MIN/{1.73_M2}
GLUCOSE SERPL-MCNC: 100 MG/DL (ref 70–99)
PHOSPHATE SERPL-MCNC: 3.5 MG/DL (ref 2.5–4.9)
POTASSIUM SERPL-SCNC: 4.3 MMOL/L (ref 3.5–5.1)
SODIUM SERPL-SCNC: 144 MMOL/L (ref 136–145)

## 2025-08-17 ENCOUNTER — RESULTS FOLLOW-UP (OUTPATIENT)
Dept: PRIMARY CARE CLINIC | Age: 89
End: 2025-08-17

## 2025-09-05 RX ORDER — CLONIDINE HYDROCHLORIDE 0.1 MG/1
TABLET ORAL
Qty: 270 TABLET | Refills: 3 | Status: SHIPPED | OUTPATIENT
Start: 2025-09-05

## (undated) DEVICE — BANDAGE,GAUZE,BULKEE II,4.5"X4.1YD,STRL: Brand: MEDLINE

## (undated) DEVICE — PEN: MARKING STD 100/CS: Brand: MEDICAL ACTION INDUSTRIES

## (undated) DEVICE — CATHETER ETER IV 20GA L125IN POLYUR STR RADPQ INTROCAN SFTY

## (undated) DEVICE — HYPODERMIC SAFETY NEEDLE: Brand: MAGELLAN

## (undated) DEVICE — DRAPE EENT SPLIT

## (undated) DEVICE — Device

## (undated) DEVICE — STAPLER SKIN LEG L3.9MM DIA0.53MM WIDE ROT HD FOR WND CLSR

## (undated) DEVICE — SOLUTION IV IRRIG 500ML 0.9% SODIUM CHL 2F7123

## (undated) DEVICE — MASC TURNOVER KIT: Brand: MEDLINE INDUSTRIES, INC.

## (undated) DEVICE — GOWN,AURORA,NONREINF,RAGLAN,XXL,STERILE: Brand: MEDLINE

## (undated) DEVICE — PACK PROCEDURE SURG EXTREMITY MFFOP CUST

## (undated) DEVICE — BLANKET WRM W40.2XL55.9IN IORT LO BODY + MISTRAL AIR

## (undated) DEVICE — TOWEL,OR,DSP,ST,BLUE,STD,4/PK,20PK/CS: Brand: MEDLINE

## (undated) DEVICE — DRESSING PETRO W3XL3IN OIL EMUL N ADH GZ KNIT IMPREG CELOS

## (undated) DEVICE — GLOVE ORANGE PI 8   MSG9080

## (undated) DEVICE — GAUZE,SPONGE,4"X4",8PLY,STRL,LF,10/TRAY: Brand: MEDLINE

## (undated) DEVICE — INTENDED FOR TISSUE SEPARATION, AND OTHER PROCEDURES THAT REQUIRE A SHARP SURGICAL BLADE TO PUNCTURE OR CUT.: Brand: BARD-PARKER ® STAINLESS STEEL BLADES

## (undated) DEVICE — MEDICINE CUP, GRADUATED, STER: Brand: MEDLINE

## (undated) DEVICE — SUTURE VCRL SZ 5-0 L18IN ABSRB UD P-3 L13MM 3/8 CIR PRIM J493H

## (undated) DEVICE — SUTURE NONABSORBABLE MONOFILAMENT 5-0 PS-2 18 IN BLK ETHILON 1666H